# Patient Record
Sex: FEMALE | Race: WHITE | URBAN - METROPOLITAN AREA
[De-identification: names, ages, dates, MRNs, and addresses within clinical notes are randomized per-mention and may not be internally consistent; named-entity substitution may affect disease eponyms.]

---

## 2018-04-11 ENCOUNTER — INPATIENT (INPATIENT)
Facility: HOSPITAL | Age: 73
LOS: 6 days | Discharge: NURSING, REHAB OR LONG-TERM CARE | DRG: 872 | End: 2018-04-18
Attending: INTERNAL MEDICINE | Admitting: INTERNAL MEDICINE
Payer: MEDICARE

## 2018-04-11 VITALS
WEIGHT: 119.93 LBS | DIASTOLIC BLOOD PRESSURE: 66 MMHG | SYSTOLIC BLOOD PRESSURE: 126 MMHG | RESPIRATION RATE: 16 BRPM | OXYGEN SATURATION: 96 % | TEMPERATURE: 103 F | HEIGHT: 65 IN | HEART RATE: 102 BPM

## 2018-04-11 RX ORDER — SODIUM CHLORIDE 9 MG/ML
1000 INJECTION INTRAMUSCULAR; INTRAVENOUS; SUBCUTANEOUS ONCE
Qty: 0 | Refills: 0 | Status: COMPLETED | OUTPATIENT
Start: 2018-04-11 | End: 2018-04-11

## 2018-04-11 RX ORDER — ONDANSETRON 8 MG/1
4 TABLET, FILM COATED ORAL ONCE
Qty: 0 | Refills: 0 | Status: COMPLETED | OUTPATIENT
Start: 2018-04-11 | End: 2018-04-11

## 2018-04-12 DIAGNOSIS — R26.9 UNSPECIFIED ABNORMALITIES OF GAIT AND MOBILITY: ICD-10-CM

## 2018-04-12 DIAGNOSIS — N39.0 URINARY TRACT INFECTION, SITE NOT SPECIFIED: ICD-10-CM

## 2018-04-12 DIAGNOSIS — F32.9 MAJOR DEPRESSIVE DISORDER, SINGLE EPISODE, UNSPECIFIED: ICD-10-CM

## 2018-04-12 DIAGNOSIS — R32 UNSPECIFIED URINARY INCONTINENCE: ICD-10-CM

## 2018-04-12 DIAGNOSIS — F03.90 UNSPECIFIED DEMENTIA WITHOUT BEHAVIORAL DISTURBANCE: ICD-10-CM

## 2018-04-12 DIAGNOSIS — G20 PARKINSON'S DISEASE: ICD-10-CM

## 2018-04-12 DIAGNOSIS — Z29.9 ENCOUNTER FOR PROPHYLACTIC MEASURES, UNSPECIFIED: ICD-10-CM

## 2018-04-12 DIAGNOSIS — I65.29 OCCLUSION AND STENOSIS OF UNSPECIFIED CAROTID ARTERY: ICD-10-CM

## 2018-04-12 DIAGNOSIS — I10 ESSENTIAL (PRIMARY) HYPERTENSION: ICD-10-CM

## 2018-04-12 DIAGNOSIS — A41.9 SEPSIS, UNSPECIFIED ORGANISM: ICD-10-CM

## 2018-04-12 LAB
ALBUMIN SERPL ELPH-MCNC: 3.2 G/DL — LOW (ref 3.5–5)
ALP SERPL-CCNC: 129 U/L — HIGH (ref 40–120)
ALT FLD-CCNC: 22 U/L DA — SIGNIFICANT CHANGE UP (ref 10–60)
ANION GAP SERPL CALC-SCNC: 10 MMOL/L — SIGNIFICANT CHANGE UP (ref 5–17)
APPEARANCE UR: CLEAR — SIGNIFICANT CHANGE UP
AST SERPL-CCNC: 12 U/L — SIGNIFICANT CHANGE UP (ref 10–40)
BILIRUB SERPL-MCNC: 0.7 MG/DL — SIGNIFICANT CHANGE UP (ref 0.2–1.2)
BILIRUB UR-MCNC: NEGATIVE — SIGNIFICANT CHANGE UP
BUN SERPL-MCNC: 11 MG/DL — SIGNIFICANT CHANGE UP (ref 7–18)
CALCIUM SERPL-MCNC: 8.6 MG/DL — SIGNIFICANT CHANGE UP (ref 8.4–10.5)
CHLORIDE SERPL-SCNC: 105 MMOL/L — SIGNIFICANT CHANGE UP (ref 96–108)
CO2 SERPL-SCNC: 25 MMOL/L — SIGNIFICANT CHANGE UP (ref 22–31)
COLOR SPEC: YELLOW — SIGNIFICANT CHANGE UP
CREAT SERPL-MCNC: 0.74 MG/DL — SIGNIFICANT CHANGE UP (ref 0.5–1.3)
DIFF PNL FLD: ABNORMAL
EOSINOPHIL NFR BLD AUTO: 1 % — SIGNIFICANT CHANGE UP (ref 0–6)
GLUCOSE SERPL-MCNC: 126 MG/DL — HIGH (ref 70–99)
GLUCOSE UR QL: NEGATIVE — SIGNIFICANT CHANGE UP
HCT VFR BLD CALC: 33.6 % — LOW (ref 34.5–45)
HGB BLD-MCNC: 10.1 G/DL — LOW (ref 11.5–15.5)
KETONES UR-MCNC: NEGATIVE — SIGNIFICANT CHANGE UP
LACTATE SERPL-SCNC: 1.1 MMOL/L — SIGNIFICANT CHANGE UP (ref 0.7–2)
LACTATE SERPL-SCNC: 1.1 MMOL/L — SIGNIFICANT CHANGE UP (ref 0.7–2)
LEUKOCYTE ESTERASE UR-ACNC: ABNORMAL
LIDOCAIN IGE QN: 28 U/L — LOW (ref 73–393)
LYMPHOCYTES # BLD AUTO: 8 % — LOW (ref 13–44)
MCHC RBC-ENTMCNC: 26.9 PG — LOW (ref 27–34)
MCHC RBC-ENTMCNC: 30 GM/DL — LOW (ref 32–36)
MCV RBC AUTO: 89.7 FL — SIGNIFICANT CHANGE UP (ref 80–100)
MONOCYTES NFR BLD AUTO: 6 % — SIGNIFICANT CHANGE UP (ref 2–14)
NEUTROPHILS NFR BLD AUTO: 83 % — HIGH (ref 43–77)
NITRITE UR-MCNC: POSITIVE
PH UR: 6 — SIGNIFICANT CHANGE UP (ref 5–8)
PLATELET # BLD AUTO: 404 K/UL — HIGH (ref 150–400)
POTASSIUM SERPL-MCNC: 3.5 MMOL/L — SIGNIFICANT CHANGE UP (ref 3.5–5.3)
POTASSIUM SERPL-SCNC: 3.5 MMOL/L — SIGNIFICANT CHANGE UP (ref 3.5–5.3)
PROT SERPL-MCNC: 7.3 G/DL — SIGNIFICANT CHANGE UP (ref 6–8.3)
PROT UR-MCNC: 15
RAPID RVP RESULT: SIGNIFICANT CHANGE UP
RBC # BLD: 3.75 M/UL — LOW (ref 3.8–5.2)
RBC # FLD: 14.8 % — HIGH (ref 10.3–14.5)
SODIUM SERPL-SCNC: 140 MMOL/L — SIGNIFICANT CHANGE UP (ref 135–145)
SP GR SPEC: 1.01 — SIGNIFICANT CHANGE UP (ref 1.01–1.02)
UROBILINOGEN FLD QL: NEGATIVE — SIGNIFICANT CHANGE UP
WBC # BLD: 18.7 K/UL — HIGH (ref 3.8–10.5)
WBC # FLD AUTO: 18.7 K/UL — HIGH (ref 3.8–10.5)

## 2018-04-12 PROCEDURE — 71045 X-RAY EXAM CHEST 1 VIEW: CPT | Mod: 26

## 2018-04-12 PROCEDURE — 99285 EMERGENCY DEPT VISIT HI MDM: CPT | Mod: 25

## 2018-04-12 RX ORDER — ACETAMINOPHEN 500 MG
650 TABLET ORAL EVERY 6 HOURS
Qty: 0 | Refills: 0 | Status: DISCONTINUED | OUTPATIENT
Start: 2018-04-12 | End: 2018-04-18

## 2018-04-12 RX ORDER — SODIUM CHLORIDE 9 MG/ML
1000 INJECTION INTRAMUSCULAR; INTRAVENOUS; SUBCUTANEOUS
Qty: 0 | Refills: 0 | Status: COMPLETED | OUTPATIENT
Start: 2018-04-12 | End: 2018-04-12

## 2018-04-12 RX ORDER — OXYBUTYNIN CHLORIDE 5 MG
5 TABLET ORAL THREE TIMES A DAY
Qty: 0 | Refills: 0 | Status: DISCONTINUED | OUTPATIENT
Start: 2018-04-12 | End: 2018-04-18

## 2018-04-12 RX ORDER — ESCITALOPRAM OXALATE 10 MG/1
10 TABLET, FILM COATED ORAL DAILY
Qty: 0 | Refills: 0 | Status: DISCONTINUED | OUTPATIENT
Start: 2018-04-12 | End: 2018-04-18

## 2018-04-12 RX ORDER — ENOXAPARIN SODIUM 100 MG/ML
40 INJECTION SUBCUTANEOUS DAILY
Qty: 0 | Refills: 0 | Status: DISCONTINUED | OUTPATIENT
Start: 2018-04-12 | End: 2018-04-18

## 2018-04-12 RX ORDER — SODIUM CHLORIDE 9 MG/ML
500 INJECTION INTRAMUSCULAR; INTRAVENOUS; SUBCUTANEOUS ONCE
Qty: 0 | Refills: 0 | Status: COMPLETED | OUTPATIENT
Start: 2018-04-12 | End: 2018-04-12

## 2018-04-12 RX ORDER — SENNA PLUS 8.6 MG/1
2 TABLET ORAL AT BEDTIME
Qty: 0 | Refills: 0 | Status: DISCONTINUED | OUTPATIENT
Start: 2018-04-12 | End: 2018-04-18

## 2018-04-12 RX ORDER — NYSTATIN CREAM 100000 [USP'U]/G
1 CREAM TOPICAL
Qty: 0 | Refills: 0 | Status: DISCONTINUED | OUTPATIENT
Start: 2018-04-12 | End: 2018-04-18

## 2018-04-12 RX ORDER — HALOPERIDOL DECANOATE 100 MG/ML
1 INJECTION INTRAMUSCULAR ONCE
Qty: 0 | Refills: 0 | Status: COMPLETED | OUTPATIENT
Start: 2018-04-12 | End: 2018-04-12

## 2018-04-12 RX ORDER — CEFTRIAXONE 500 MG/1
1 INJECTION, POWDER, FOR SOLUTION INTRAMUSCULAR; INTRAVENOUS ONCE
Qty: 0 | Refills: 0 | Status: COMPLETED | OUTPATIENT
Start: 2018-04-12 | End: 2018-04-12

## 2018-04-12 RX ORDER — ASPIRIN/CALCIUM CARB/MAGNESIUM 324 MG
81 TABLET ORAL DAILY
Qty: 0 | Refills: 0 | Status: DISCONTINUED | OUTPATIENT
Start: 2018-04-12 | End: 2018-04-18

## 2018-04-12 RX ORDER — CEFTRIAXONE 500 MG/1
1 INJECTION, POWDER, FOR SOLUTION INTRAMUSCULAR; INTRAVENOUS EVERY 24 HOURS
Qty: 0 | Refills: 0 | Status: DISCONTINUED | OUTPATIENT
Start: 2018-04-12 | End: 2018-04-13

## 2018-04-12 RX ORDER — ACETAMINOPHEN 500 MG
975 TABLET ORAL ONCE
Qty: 0 | Refills: 0 | Status: COMPLETED | OUTPATIENT
Start: 2018-04-12 | End: 2018-04-12

## 2018-04-12 RX ORDER — ASCORBIC ACID 60 MG
1 TABLET,CHEWABLE ORAL
Qty: 0 | Refills: 0 | COMMUNITY

## 2018-04-12 RX ORDER — MIDAZOLAM HYDROCHLORIDE 1 MG/ML
1 INJECTION, SOLUTION INTRAMUSCULAR; INTRAVENOUS ONCE
Qty: 0 | Refills: 0 | Status: DISCONTINUED | OUTPATIENT
Start: 2018-04-12 | End: 2018-04-12

## 2018-04-12 RX ORDER — NICOTINE POLACRILEX 2 MG
1 GUM BUCCAL DAILY
Qty: 0 | Refills: 0 | Status: DISCONTINUED | OUTPATIENT
Start: 2018-04-12 | End: 2018-04-18

## 2018-04-12 RX ADMIN — Medication 81 MILLIGRAM(S): at 16:05

## 2018-04-12 RX ADMIN — Medication 650 MILLIGRAM(S): at 16:01

## 2018-04-12 RX ADMIN — CEFTRIAXONE 100 GRAM(S): 500 INJECTION, POWDER, FOR SOLUTION INTRAMUSCULAR; INTRAVENOUS at 01:45

## 2018-04-12 RX ADMIN — SODIUM CHLORIDE 100 MILLILITER(S): 9 INJECTION INTRAMUSCULAR; INTRAVENOUS; SUBCUTANEOUS at 10:25

## 2018-04-12 RX ADMIN — HALOPERIDOL DECANOATE 1 MILLIGRAM(S): 100 INJECTION INTRAMUSCULAR at 10:29

## 2018-04-12 RX ADMIN — ESCITALOPRAM OXALATE 10 MILLIGRAM(S): 10 TABLET, FILM COATED ORAL at 16:03

## 2018-04-12 RX ADMIN — Medication 5 MILLIGRAM(S): at 16:02

## 2018-04-12 RX ADMIN — SENNA PLUS 2 TABLET(S): 8.6 TABLET ORAL at 21:38

## 2018-04-12 RX ADMIN — Medication 975 MILLIGRAM(S): at 03:04

## 2018-04-12 RX ADMIN — ONDANSETRON 4 MILLIGRAM(S): 8 TABLET, FILM COATED ORAL at 01:29

## 2018-04-12 RX ADMIN — ENOXAPARIN SODIUM 40 MILLIGRAM(S): 100 INJECTION SUBCUTANEOUS at 16:05

## 2018-04-12 RX ADMIN — Medication 1 PATCH: at 20:15

## 2018-04-12 RX ADMIN — SODIUM CHLORIDE 500 MILLILITER(S): 9 INJECTION INTRAMUSCULAR; INTRAVENOUS; SUBCUTANEOUS at 01:40

## 2018-04-12 RX ADMIN — SODIUM CHLORIDE 1000 MILLILITER(S): 9 INJECTION INTRAMUSCULAR; INTRAVENOUS; SUBCUTANEOUS at 00:40

## 2018-04-12 NOTE — H&P ADULT - PROBLEM SELECTOR PLAN 1
meets sepsis criteria on admission: leukocytosis 18, fever 102, source of infection  s/p 2L bolus  rocephin  f/u ucx and bcx  tylenol PRN fever  CXR neg meets sepsis criteria on admission: leukocytosis 18, fever 102, source of infection  s/p 2L bolus  rocephin  f/u ucx and bcx  tylenol PRN fever  CXR neg  rvp neg  lactate normal meets sepsis criteria on admission: leukocytosis 18, fever 102, source of infection  s/p 2L bolus  rocephin  f/u ucx and bcx  tylenol PRN fever  CXR neg  rvp neg  lactate normal  Dr. Gaytan- ID

## 2018-04-12 NOTE — ED PROVIDER NOTE - OBJECTIVE STATEMENT
patient sent from Nursing Home for fever and 1 episode of vomiting small amount of food today. according to Nursing Home chart, patient on ceftin for bronchitis. patient with 3 days of fever. patient is anxious appearing, nervous.

## 2018-04-12 NOTE — PATIENT PROFILE ADULT. - HEALTH/HEALTHCARE ANXIETIES, PROFILE
none expressed but patient has been making attempts to get out of bed. Placed in front of nurse's station

## 2018-04-12 NOTE — H&P ADULT - NSHPPHYSICALEXAM_GEN_ALL_CORE
INTERVAL HPI/OVERNIGHT EVENTS:  T(C): 37.3 (04-12-18 @ 08:18), Max: 39.2 (04-11-18 @ 23:19)  HR: 71 (04-12-18 @ 08:18) (71 - 102)  BP: 125/49 (04-12-18 @ 08:18) (118/49 - 126/66)  RR: 18 (04-12-18 @ 08:18) (16 - 18)  SpO2: 98% (04-12-18 @ 08:18) (96% - 98%)    PHYSICAL EXAM:  GENERAL: agitated, well-groomed, well-developed  HEAD:  Atraumatic, Normocephalic  EYES: EOMI, PERRLA, conjunctiva and sclera clear  ENMT: No tonsillar erythema, exudates, or enlargement; dry mucous membranes  NECK: Supple, No JVD, Normal thyroid  NERVOUS SYSTEM:  Alert & Oriented 1, Motor Strength 5/5 B/L upper and lower extremities; DTRs 2+ intact and symmetric  CHEST/LUNG: Clear to percussion bilaterally; No rales, rhonchi, wheezing, or rubs  HEART: Regular rate and rhythm; No murmurs, rubs, or gallops  ABDOMEN: Soft, Nontender, Nondistended; Bowel sounds present  EXTREMITIES:  2+ Peripheral Pulses, No clubbing, cyanosis, or edema  LYMPH: No lymphadenopathy noted  SKIN: No rashes or lesions

## 2018-04-12 NOTE — H&P ADULT - PROBLEM SELECTOR PLAN 10
IMPROVE VTE Individual Risk Assessment          RISK                                                          Points  [  ] Previous VTE                                                3  [  ] Thrombophilia                                             2  [  ] Lower limb paralysis                                   2        (unable to hold up >15 seconds)    [  ] Current Cancer                                             2         (within 6 months)  [  x] Immobilization > 24 hrs                              1  [  ] ICU/CCU stay > 24 hours                             1  [  x] Age > 60                                                         1    IMPROVE VTE Score: 2, lovenox for dvt ppx

## 2018-04-12 NOTE — ED ADULT NURSE REASSESSMENT NOTE - NS ED NURSE REASSESS COMMENT FT1
Patient sitting up in bed, awake, in no acute distress. Safety precautions maintained. IVF in progress. ED observation continues.

## 2018-04-12 NOTE — H&P ADULT - ASSESSMENT
[facility] is a 72F [facility] with PMH dementia, , depression, Parkinson's, HTN, CVA, L carotid stenosis, sepsis 2/2 UTI 6 mo ago, gait instability presenting with 3 day fever. Admitted for sepsis 2/2 UTI.

## 2018-04-12 NOTE — H&P ADULT - NSHPLABSRESULTS_GEN_ALL_CORE
LABS:                        10.1   18.7  )-----------( 404      ( 2018 01:07 )             33.6     04-12    140  |  105  |  11  ----------------------------<  126<H>  3.5   |  25  |  0.74    Ca    8.6      2018 01:07    TPro  7.3  /  Alb  3.2<L>  /  TBili  0.7  /  DBili  x   /  AST  12  /  ALT  22  /  AlkPhos  129<H>  0412      Urinalysis Basic - ( 2018 03:30 )    Color: Yellow / Appearance: Clear / S.010 / pH: x  Gluc: x / Ketone: Negative  / Bili: Negative / Urobili: Negative   Blood: x / Protein: 15 / Nitrite: Positive   Leuk Esterase: Moderate / RBC: 2-5 /HPF / WBC 26-50 /HPF   Sq Epi: x / Non Sq Epi: Occasional /HPF / Bacteria: Many /HPF    < from: Xray Chest 1 View- PORTABLE-Urgent (18 @ 00:54) >    Normal heart mediastinum. No consolidation or effusion. Mild thoracic   dextroscoliosis.    Impression:  No active disease    < end of copied text > LABS:                        10.1   18.7  )-----------( 404      ( 2018 01:07 )             33.6     04-12    140  |  105  |  11  ----------------------------<  126<H>  3.5   |  25  |  0.74    Ca    8.6      2018 01:07    TPro  7.3  /  Alb  3.2<L>  /  TBili  0.7  /  DBili  x   /  AST  12  /  ALT  22  /  AlkPhos  129<H>  0412    Urinalysis Basic - ( 2018 03:30 )    Color: Yellow / Appearance: Clear / S.010 / pH: x  Gluc: x / Ketone: Negative  / Bili: Negative / Urobili: Negative   Blood: x / Protein: 15 / Nitrite: Positive   Leuk Esterase: Moderate / RBC: 2-5 /HPF / WBC 26-50 /HPF   Sq Epi: x / Non Sq Epi: Occasional /HPF / Bacteria: Many /HPF    < from: Xray Chest 1 View- PORTABLE-Urgent (18 @ 00:54) >    Normal heart mediastinum. No consolidation or effusion. Mild thoracic   dextroscoliosis.    Impression:  No active disease    < end of copied text >

## 2018-04-12 NOTE — ED PROVIDER NOTE - MEDICAL DECISION MAKING DETAILS
Patient with sepsis, fever.  code sepsis called. patient received 1mg versed for anxiolysis. IV fluids, tylenol. u/a revealed Urinary tract infection. admit for IV antibiotics IV fluids. abdomen soft nontender

## 2018-04-12 NOTE — H&P ADULT - HISTORY OF PRESENT ILLNESS
Patient is a 72F from [facility], AAOx[facility], with PMH dementia, , depression, Parkinson's, HTN, CVA, L carotid stenosis, sepsis 2/2 UTI 6 mo ago, gait instability presenting with 3 day fever. Patient was seen in the ED, agitated, not cooperative with full interview. Denies SOB, palpitations, chest pain, nausea, vomiting, diarrhea or constipation and reports wanting to return to the NH and says she feels generalized malaise. Got 1mg versed and 1mg haldol in the ED. Further history unobtainable due to agitation. As per NH, s/p recent course of ceftin for bronchitis 3/25-4/4.

## 2018-04-12 NOTE — ED ADULT NURSE NOTE - ED STAT RN HANDOFF DETAILS
Pt endorsed to [facility]. Pt is aox2. Pt awaiting bed assignment for admission. Pt not in any acute distress.

## 2018-04-12 NOTE — H&P ADULT - PMH
Carotid stenosis    Dementia    Depression    Gait abnormality    Hypertension    Parkinson disease    Urinary incontinence

## 2018-04-13 LAB
24R-OH-CALCIDIOL SERPL-MCNC: 20.4 NG/ML — LOW (ref 30–80)
ALBUMIN SERPL ELPH-MCNC: 2.9 G/DL — LOW (ref 3.5–5)
ALP SERPL-CCNC: 115 U/L — SIGNIFICANT CHANGE UP (ref 40–120)
ALT FLD-CCNC: 16 U/L DA — SIGNIFICANT CHANGE UP (ref 10–60)
ANION GAP SERPL CALC-SCNC: 11 MMOL/L — SIGNIFICANT CHANGE UP (ref 5–17)
AST SERPL-CCNC: 11 U/L — SIGNIFICANT CHANGE UP (ref 10–40)
BASOPHILS # BLD AUTO: 0.1 K/UL — SIGNIFICANT CHANGE UP (ref 0–0.2)
BASOPHILS NFR BLD AUTO: 0.7 % — SIGNIFICANT CHANGE UP (ref 0–2)
BILIRUB SERPL-MCNC: 0.4 MG/DL — SIGNIFICANT CHANGE UP (ref 0.2–1.2)
BUN SERPL-MCNC: 11 MG/DL — SIGNIFICANT CHANGE UP (ref 7–18)
CALCIUM SERPL-MCNC: 8.4 MG/DL — SIGNIFICANT CHANGE UP (ref 8.4–10.5)
CHLORIDE SERPL-SCNC: 105 MMOL/L — SIGNIFICANT CHANGE UP (ref 96–108)
CHOLEST SERPL-MCNC: 130 MG/DL — SIGNIFICANT CHANGE UP (ref 10–199)
CO2 SERPL-SCNC: 23 MMOL/L — SIGNIFICANT CHANGE UP (ref 22–31)
CREAT SERPL-MCNC: 0.56 MG/DL — SIGNIFICANT CHANGE UP (ref 0.5–1.3)
E COLI DNA BLD POS QL NAA+NON-PROBE: SIGNIFICANT CHANGE UP
EOSINOPHIL # BLD AUTO: 0 K/UL — SIGNIFICANT CHANGE UP (ref 0–0.5)
EOSINOPHIL NFR BLD AUTO: 0.2 % — SIGNIFICANT CHANGE UP (ref 0–6)
FOLATE SERPL-MCNC: 16.9 NG/ML — SIGNIFICANT CHANGE UP (ref 4.8–24.2)
GLUCOSE BLDC GLUCOMTR-MCNC: 115 MG/DL — HIGH (ref 70–99)
GLUCOSE SERPL-MCNC: 94 MG/DL — SIGNIFICANT CHANGE UP (ref 70–99)
GRAM STN FLD: SIGNIFICANT CHANGE UP
HBA1C BLD-MCNC: 6.1 % — HIGH (ref 4–5.6)
HCT VFR BLD CALC: 31.4 % — LOW (ref 34.5–45)
HDLC SERPL-MCNC: 26 MG/DL — LOW (ref 40–125)
HGB BLD-MCNC: 9.5 G/DL — LOW (ref 11.5–15.5)
INR BLD: 1.04 RATIO — SIGNIFICANT CHANGE UP (ref 0.88–1.16)
LIPID PNL WITH DIRECT LDL SERPL: 79 MG/DL — SIGNIFICANT CHANGE UP
LYMPHOCYTES # BLD AUTO: 15.8 % — SIGNIFICANT CHANGE UP (ref 13–44)
LYMPHOCYTES # BLD AUTO: 2.1 K/UL — SIGNIFICANT CHANGE UP (ref 1–3.3)
MAGNESIUM SERPL-MCNC: 1.8 MG/DL — SIGNIFICANT CHANGE UP (ref 1.6–2.6)
MCHC RBC-ENTMCNC: 26.8 PG — LOW (ref 27–34)
MCHC RBC-ENTMCNC: 30 GM/DL — LOW (ref 32–36)
MCV RBC AUTO: 89.1 FL — SIGNIFICANT CHANGE UP (ref 80–100)
METHOD TYPE: SIGNIFICANT CHANGE UP
MONOCYTES # BLD AUTO: 0.9 K/UL — SIGNIFICANT CHANGE UP (ref 0–0.9)
MONOCYTES NFR BLD AUTO: 6.9 % — SIGNIFICANT CHANGE UP (ref 2–14)
NEUTROPHILS # BLD AUTO: 10.4 K/UL — HIGH (ref 1.8–7.4)
NEUTROPHILS NFR BLD AUTO: 76.5 % — SIGNIFICANT CHANGE UP (ref 43–77)
PHOSPHATE SERPL-MCNC: 2.6 MG/DL — SIGNIFICANT CHANGE UP (ref 2.5–4.5)
PLATELET # BLD AUTO: 338 K/UL — SIGNIFICANT CHANGE UP (ref 150–400)
POTASSIUM SERPL-MCNC: 3.3 MMOL/L — LOW (ref 3.5–5.3)
POTASSIUM SERPL-SCNC: 3.3 MMOL/L — LOW (ref 3.5–5.3)
PROT SERPL-MCNC: 6.8 G/DL — SIGNIFICANT CHANGE UP (ref 6–8.3)
PROTHROM AB SERPL-ACNC: 11.4 SEC — SIGNIFICANT CHANGE UP (ref 9.8–12.7)
RBC # BLD: 3.53 M/UL — LOW (ref 3.8–5.2)
RBC # FLD: 14.8 % — HIGH (ref 10.3–14.5)
SODIUM SERPL-SCNC: 139 MMOL/L — SIGNIFICANT CHANGE UP (ref 135–145)
SPECIMEN SOURCE: SIGNIFICANT CHANGE UP
TOTAL CHOLESTEROL/HDL RATIO MEASUREMENT: 5 RATIO — SIGNIFICANT CHANGE UP (ref 3.3–7.1)
TRIGL SERPL-MCNC: 125 MG/DL — SIGNIFICANT CHANGE UP (ref 10–149)
TSH SERPL-MCNC: 1.5 UU/ML — SIGNIFICANT CHANGE UP (ref 0.34–4.82)
VIT B12 SERPL-MCNC: 398 PG/ML — SIGNIFICANT CHANGE UP (ref 232–1245)
WBC # BLD: 13.6 K/UL — HIGH (ref 3.8–10.5)
WBC # FLD AUTO: 13.6 K/UL — HIGH (ref 3.8–10.5)

## 2018-04-13 RX ORDER — POTASSIUM CHLORIDE 20 MEQ
40 PACKET (EA) ORAL ONCE
Qty: 0 | Refills: 0 | Status: COMPLETED | OUTPATIENT
Start: 2018-04-13 | End: 2018-04-13

## 2018-04-13 RX ORDER — HALOPERIDOL DECANOATE 100 MG/ML
1 INJECTION INTRAMUSCULAR ONCE
Qty: 0 | Refills: 0 | Status: COMPLETED | OUTPATIENT
Start: 2018-04-13 | End: 2018-04-13

## 2018-04-13 RX ORDER — CEFTRIAXONE 500 MG/1
1 INJECTION, POWDER, FOR SOLUTION INTRAMUSCULAR; INTRAVENOUS EVERY 24 HOURS
Qty: 0 | Refills: 0 | Status: DISCONTINUED | OUTPATIENT
Start: 2018-04-13 | End: 2018-04-14

## 2018-04-13 RX ORDER — ZALEPLON 10 MG
5 CAPSULE ORAL ONCE
Qty: 0 | Refills: 0 | Status: DISCONTINUED | OUTPATIENT
Start: 2018-04-13 | End: 2018-04-14

## 2018-04-13 RX ORDER — LACTULOSE 10 G/15ML
15 SOLUTION ORAL DAILY
Qty: 0 | Refills: 0 | Status: DISCONTINUED | OUTPATIENT
Start: 2018-04-13 | End: 2018-04-16

## 2018-04-13 RX ORDER — PIPERACILLIN AND TAZOBACTAM 4; .5 G/20ML; G/20ML
3.38 INJECTION, POWDER, LYOPHILIZED, FOR SOLUTION INTRAVENOUS EVERY 8 HOURS
Qty: 0 | Refills: 0 | Status: DISCONTINUED | OUTPATIENT
Start: 2018-04-13 | End: 2018-04-13

## 2018-04-13 RX ORDER — PIPERACILLIN AND TAZOBACTAM 4; .5 G/20ML; G/20ML
3.38 INJECTION, POWDER, LYOPHILIZED, FOR SOLUTION INTRAVENOUS ONCE
Qty: 0 | Refills: 0 | Status: COMPLETED | OUTPATIENT
Start: 2018-04-13 | End: 2018-04-13

## 2018-04-13 RX ORDER — POLYETHYLENE GLYCOL 3350 17 G/17G
17 POWDER, FOR SOLUTION ORAL DAILY
Qty: 0 | Refills: 0 | Status: DISCONTINUED | OUTPATIENT
Start: 2018-04-13 | End: 2018-04-16

## 2018-04-13 RX ADMIN — HALOPERIDOL DECANOATE 1 MILLIGRAM(S): 100 INJECTION INTRAMUSCULAR at 16:16

## 2018-04-13 RX ADMIN — Medication 5 MILLIGRAM(S): at 13:50

## 2018-04-13 RX ADMIN — SODIUM CHLORIDE 100 MILLILITER(S): 9 INJECTION INTRAMUSCULAR; INTRAVENOUS; SUBCUTANEOUS at 18:37

## 2018-04-13 RX ADMIN — Medication 40 MILLIEQUIVALENT(S): at 13:50

## 2018-04-13 RX ADMIN — Medication 1 PATCH: at 20:30

## 2018-04-13 RX ADMIN — CEFTRIAXONE 100 GRAM(S): 500 INJECTION, POWDER, FOR SOLUTION INTRAMUSCULAR; INTRAVENOUS at 01:12

## 2018-04-13 RX ADMIN — PIPERACILLIN AND TAZOBACTAM 25 GRAM(S): 4; .5 INJECTION, POWDER, LYOPHILIZED, FOR SOLUTION INTRAVENOUS at 13:50

## 2018-04-13 RX ADMIN — Medication 81 MILLIGRAM(S): at 12:22

## 2018-04-13 RX ADMIN — ENOXAPARIN SODIUM 40 MILLIGRAM(S): 100 INJECTION SUBCUTANEOUS at 12:22

## 2018-04-13 RX ADMIN — NYSTATIN CREAM 1 APPLICATION(S): 100000 CREAM TOPICAL at 17:24

## 2018-04-13 RX ADMIN — POLYETHYLENE GLYCOL 3350 17 GRAM(S): 17 POWDER, FOR SOLUTION ORAL at 13:48

## 2018-04-13 RX ADMIN — CEFTRIAXONE 100 GRAM(S): 500 INJECTION, POWDER, FOR SOLUTION INTRAMUSCULAR; INTRAVENOUS at 18:35

## 2018-04-13 RX ADMIN — Medication 5 MILLIGRAM(S): at 22:08

## 2018-04-13 RX ADMIN — NYSTATIN CREAM 1 APPLICATION(S): 100000 CREAM TOPICAL at 05:33

## 2018-04-13 RX ADMIN — LACTULOSE 15 GRAM(S): 10 SOLUTION ORAL at 13:48

## 2018-04-13 RX ADMIN — Medication 5 MILLIGRAM(S): at 05:33

## 2018-04-13 RX ADMIN — Medication 1 PATCH: at 13:49

## 2018-04-13 RX ADMIN — PIPERACILLIN AND TAZOBACTAM 200 GRAM(S): 4; .5 INJECTION, POWDER, LYOPHILIZED, FOR SOLUTION INTRAVENOUS at 10:30

## 2018-04-13 RX ADMIN — ESCITALOPRAM OXALATE 10 MILLIGRAM(S): 10 TABLET, FILM COATED ORAL at 12:23

## 2018-04-13 NOTE — ADVANCED PRACTICE NURSE CONSULT - ASSESSMENT
This is a 72yr old female patient admitted for UTI, presenting with a Stage 1 Pressure Injury to the Sacrum and Bilateral Heels; as evident by non-blanchable erythema

## 2018-04-13 NOTE — CONSULT NOTE ADULT - ASSESSMENT
sepsis  UTI  bacteremia  fevers  leukocytosis    plan - dc zosyn  start rocephin 1 gm iv q24 hrs  repeat blood cults in am

## 2018-04-13 NOTE — PROGRESS NOTE ADULT - SUBJECTIVE AND OBJECTIVE BOX
PGY 1 Note discussed with supervising resident and primary attending    Patient is a 72y old  Female who presents with a chief complaint of fever (2018 10:46)      INTERVAL HPI/OVERNIGHT EVENTS: Patient seen and examined at bed side, patient was calm, complains of constipation offers no new complaints; current symptoms resolving    MEDICATIONS  (STANDING):  aspirin  chewable 81 milliGRAM(s) Oral daily  enoxaparin Injectable 40 milliGRAM(s) SubCutaneous daily  escitalopram 10 milliGRAM(s) Oral daily  nicotine -   7 mG/24Hr(s) Patch 1 patch Transdermal daily  nystatin Cream 1 Application(s) Topical two times a day  oxybutynin 5 milliGRAM(s) Oral three times a day  piperacillin/tazobactam IVPB. 3.375 Gram(s) IV Intermittent every 8 hours  senna 2 Tablet(s) Oral at bedtime  sodium chloride 0.9%. 1000 milliLiter(s) (100 mL/Hr) IV Continuous <Continuous>    MEDICATIONS  (PRN):  acetaminophen   Tablet 650 milliGRAM(s) Oral every 6 hours PRN For Temp greater than 38 C (100.4 F)      __________________________________________________  REVIEW OF SYSTEMS:    CONSTITUTIONAL: No fever,   EYES: no acute visual disturbances  NECK: No pain or stiffness  RESPIRATORY: No cough; No shortness of breath  CARDIOVASCULAR: No chest pain, no palpitations  GASTROINTESTINAL: No pain. No nausea or vomiting; No diarrhea   NEUROLOGICAL: No headache or numbness, no tremors  MUSCULOSKELETAL: No joint pain, no muscle pain  GENITOURINARY: no dysuria, no frequency, no hesitancy  PSYCHIATRY: no depression , no anxiety  ALL OTHER  ROS negative        Vital Signs Last 24 Hrs  T(C): 37.2 (2018 08:53), Max: 39.3 (2018 15:49)  T(F): 99 (2018 08:53), Max: 102.7 (2018 15:49)  HR: 63 (2018 08:53) (63 - 81)  BP: 111/45 (2018 08:53) (111/45 - 133/62)  BP(mean): --  RR: 17 (2018 08:53) (15 - 18)  SpO2: 96% (2018 08:53) (95% - 99%)    ________________________________________________  PHYSICAL EXAM:  GENERAL: NAD  HEENT: Normocephalic;  conjunctivae and sclerae clear; moist mucous membranes;   NECK : supple  CHEST/LUNG: Clear to auscultation bilaterally with good air entry   HEART: S1 S2  regular; no murmurs, gallops or rubs  ABDOMEN: Soft, Nontender, Nondistended; Bowel sounds present  EXTREMITIES: no cyanosis; no edema; no calf tenderness  SKIN: warm and dry; no rash  NERVOUS SYSTEM:  AAOx2    _________________________________________________  LABS:                        9.5    13.6  )-----------( 338      ( 2018 05:55 )             31.4     04-13    139  |  105  |  11  ----------------------------<  94  3.3<L>   |  23  |  0.56    Ca    8.4      2018 05:55  Phos  2.6     04-13  Mg     1.8     04-13    TPro  6.8  /  Alb  2.9<L>  /  TBili  0.4  /  DBili  x   /  AST  11  /  ALT  16  /  AlkPhos  115  04-13    PT/INR - ( 2018 05:55 )   PT: 11.4 sec;   INR: 1.04 ratio           Urinalysis Basic - ( 2018 03:30 )    Color: Yellow / Appearance: Clear / S.010 / pH: x  Gluc: x / Ketone: Negative  / Bili: Negative / Urobili: Negative   Blood: x / Protein: 15 / Nitrite: Positive   Leuk Esterase: Moderate / RBC: 2-5 /HPF / WBC 26-50 /HPF   Sq Epi: x / Non Sq Epi: Occasional /HPF / Bacteria: Many /HPF      CAPILLARY BLOOD GLUCOSE      POCT Blood Glucose.: 115 mg/dL (2018 08:29)        RADIOLOGY & ADDITIONAL TESTS:  Culture - Urine (18 @ 10:30)    Specimen Source: .Urine Clean Catch (Midstream)    Culture Results:   >100,000 CFU/ml Escherichia coli      Culture - Blood (18 @ 11:19)    Specimen Source: .Blood Blood-Peripheral    Culture Results:   No growth to date.            Imaging Personally Reviewed:  YES    Consultant(s) Notes Reviewed:   YES    Care Discussed with Consultants : Yes     Plan of care was discussed with patient and /or primary care giver; all questions and concerns were addressed and care was aligned with patient's wishes.

## 2018-04-13 NOTE — PROGRESS NOTE ADULT - PROBLEM SELECTOR PLAN 2
Ua positive   urine and bllod cx positive for E. Coli   will treat patient with Zosyn   ID consult Dr Gaytan   f/u  repeat Blood cultures Ua positive   urine and bllod cx positive for E. Coli   will treat patient with Zosyn   ID consult Dr Gaytan   f/u  repeat Blood cultures  urine cultures shows ESBL   will hold Rocephin and stat patient on meropenem

## 2018-04-13 NOTE — ADVANCED PRACTICE NURSE CONSULT - RECOMMEDATIONS
-Apply a Foam dressing to the Sacrum Q 72hrs PRN  -Elevate/float the patients heels using heel protectors and reposition the patient Q 2hrs using wedges or pillows

## 2018-04-13 NOTE — PROVIDER CONTACT NOTE (CRITICAL VALUE NOTIFICATION) - SITUATION
pt 412C critical. Blood cult positive. preliminary results. growth in anaerobic bottle: gram negative rods.

## 2018-04-13 NOTE — CONSULT NOTE ADULT - SUBJECTIVE AND OBJECTIVE BOX
HPI:  Patient is a 72F from [facility], AAOx1, with PMH dementia, , depression, Parkinson's, HTN, CVA, L carotid stenosis, sepsis 2/2 UTI 6 mo ago, gait instability presenting with 3 day fever. Patient was seen in the ED, agitated, not cooperative with full interview. Denies SOB, palpitations, chest pain, nausea, vomiting, diarrhea or constipation and reports wanting to return to the NH and says she feels generalized malaise. Got 1mg versed and 1mg haldol in the ED. Further history unobtainable due to agitation. As per NH, s/p recent course of ceftin for bronchitis 3/25-.       PAST MEDICAL & SURGICAL HISTORY:  Gait abnormality  Carotid stenosis  Parkinson disease  Urinary incontinence  Depression  Dementia  Hypertension  No significant past surgical history      No Known Allergies      Meds:  acetaminophen   Tablet 650 milliGRAM(s) Oral every 6 hours PRN  aspirin  chewable 81 milliGRAM(s) Oral daily  enoxaparin Injectable 40 milliGRAM(s) SubCutaneous daily  escitalopram 10 milliGRAM(s) Oral daily  lactulose Syrup 15 Gram(s) Oral daily  nicotine -   7 mG/24Hr(s) Patch 1 patch Transdermal daily  nystatin Cream 1 Application(s) Topical two times a day  oxybutynin 5 milliGRAM(s) Oral three times a day  piperacillin/tazobactam IVPB. 3.375 Gram(s) IV Intermittent every 8 hours  polyethylene glycol 3350 17 Gram(s) Oral daily  senna 2 Tablet(s) Oral at bedtime  sodium chloride 0.9%. 1000 milliLiter(s) IV Continuous <Continuous>      SOCIAL HISTORY:  Smoker:   ETOH use:      FAMILY HISTORY:  No pertinent family history in first degree relatives      VITALS:  Vital Signs Last 24 Hrs  T(C): 37.2 (2018 08:53), Max: 39.3 (2018 15:49)  T(F): 99 (2018 08:53), Max: 102.7 (2018 15:49)  HR: 63 (2018 08:53) (63 - 81)  BP: 111/45 (2018 08:53) (111/45 - 133/62)  BP(mean): --  RR: 17 (2018 08:53) (15 - 18)  SpO2: 96% (2018 08:53) (95% - 99%)    LABS/DIAGNOSTIC TESTS:                          9.5    13.6  )-----------( 338      ( 2018 05:55 )             31.4     WBC Count: 13.6 K/uL ( @ 05:55)  WBC Count: 18.7 K/uL ( @ 01:07)          139  |  105  |  11  ----------------------------<  94  3.3<L>   |  23  |  0.56    Ca    8.4      2018 05:55  Phos  2.6       Mg     1.8         TPro  6.8  /  Alb  2.9<L>  /  TBili  0.4  /  DBili  x   /  AST  11  /  ALT  16  /  AlkPhos  115        Urinalysis Basic - ( 2018 03:30 )    Color: Yellow / Appearance: Clear / S.010 / pH: x  Gluc: x / Ketone: Negative  / Bili: Negative / Urobili: Negative   Blood: x / Protein: 15 / Nitrite: Positive   Leuk Esterase: Moderate / RBC: 2-5 /HPF / WBC 26-50 /HPF   Sq Epi: x / Non Sq Epi: Occasional /HPF / Bacteria: Many /HPF        LIVER FUNCTIONS - ( 2018 05:55 )  Alb: 2.9 g/dL / Pro: 6.8 g/dL / ALK PHOS: 115 U/L / ALT: 16 U/L DA / AST: 11 U/L / GGT: x             PT/INR - ( 2018 05:55 )   PT: 11.4 sec;   INR: 1.04 ratio             LACTATE:    ABG -     CULTURES:   .Blood Blood-Peripheral   @ 11:19   Growth in anaerobic bottle:  Gram Negative Rods    .Urine Clean Catch (Midstream)   @ 10:30   >100,000 CFU/ml Escherichia coli  --  --          RADIOLOGY:< from: Xray Chest 1 View- PORTABLE-Urgent (18 @ 00:54) >  EXAM:  XR CHEST PORTABLE URGENT 1V                            PROCEDURE DATE:  2018          INTERPRETATION:  Fever.    AP chest.    Normal heart mediastinum. No consolidation or effusion. Mild thoracic   dextroscoliosis.    Impression:  No active disease          < end of copied text >        ROS  [  ] UNABLE TO ELICIT HPI:  Patient is a 72F from [facility], AAOx 2, with PMH dementia, , depression, Parkinson's, HTN, CVA, L carotid stenosis, gait instability presenting with 3 days of fever. Pt found to have a high fever here along with a UTI and bacteremia with E. Coli. she did state to me that she had dysuria which she doesn't have at this time. Denies SOB, palpitations, chest pain, nausea, vomiting, diarrhea. As per NH, s/p recent course of ceftin for bronchitis 3/25-. she is doing much better today is afebrile today.      PAST MEDICAL & SURGICAL HISTORY:  Gait abnormality  Carotid stenosis  Parkinson disease  Urinary incontinence  Depression  Dementia  Hypertension  No significant past surgical history      No Known Allergies      Meds:  acetaminophen   Tablet 650 milliGRAM(s) Oral every 6 hours PRN  aspirin  chewable 81 milliGRAM(s) Oral daily  enoxaparin Injectable 40 milliGRAM(s) SubCutaneous daily  escitalopram 10 milliGRAM(s) Oral daily  lactulose Syrup 15 Gram(s) Oral daily  nicotine -   7 mG/24Hr(s) Patch 1 patch Transdermal daily  nystatin Cream 1 Application(s) Topical two times a day  oxybutynin 5 milliGRAM(s) Oral three times a day  piperacillin/tazobactam IVPB. 3.375 Gram(s) IV Intermittent every 8 hours  polyethylene glycol 3350 17 Gram(s) Oral daily  senna 2 Tablet(s) Oral at bedtime  sodium chloride 0.9%. 1000 milliLiter(s) IV Continuous <Continuous>      SOCIAL HISTORY:  Smoker: ex smoker  ETOH use: drank alcohol in past     FAMILY HISTORY:  No pertinent family history in first degree relatives      VITALS:  Vital Signs Last 24 Hrs  T(C): 37.2 (2018 08:53), Max: 39.3 (2018 15:49)  T(F): 99 (2018 08:53), Max: 102.7 (2018 15:49)  HR: 63 (2018 08:53) (63 - 81)  BP: 111/45 (2018 08:53) (111/45 - 133/62)  BP(mean): --  RR: 17 (2018 08:53) (15 - 18)  SpO2: 96% (2018 08:53) (95% - 99%)    LABS/DIAGNOSTIC TESTS:                          9.5    13.6  )-----------( 338      ( 2018 05:55 )             31.4     WBC Count: 13.6 K/uL ( @ 05:55)  WBC Count: 18.7 K/uL ( @ 01:07)          139  |  105  |  11  ----------------------------<  94  3.3<L>   |  23  |  0.56    Ca    8.4      2018 05:55  Phos  2.6       Mg     1.8         TPro  6.8  /  Alb  2.9<L>  /  TBili  0.4  /  DBili  x   /  AST  11  /  ALT  16  /  AlkPhos  115        Urinalysis Basic - ( 2018 03:30 )    Color: Yellow / Appearance: Clear / S.010 / pH: x  Gluc: x / Ketone: Negative  / Bili: Negative / Urobili: Negative   Blood: x / Protein: 15 / Nitrite: Positive   Leuk Esterase: Moderate / RBC: 2-5 /HPF / WBC 26-50 /HPF   Sq Epi: x / Non Sq Epi: Occasional /HPF / Bacteria: Many /HPF        LIVER FUNCTIONS - ( 2018 05:55 )  Alb: 2.9 g/dL / Pro: 6.8 g/dL / ALK PHOS: 115 U/L / ALT: 16 U/L DA / AST: 11 U/L / GGT: x             PT/INR - ( 2018 05:55 )   PT: 11.4 sec;   INR: 1.04 ratio             LACTATE:    ABG -     CULTURES:   .Blood Blood-Peripheral   @ 11:19   Growth in anaerobic bottle:  Gram Negative Rods    .Urine Clean Catch (Midstream)   @ 10:30   >100,000 CFU/ml Escherichia coli  --  --          RADIOLOGY:< from: Xray Chest 1 View- PORTABLE-Urgent (18 @ 00:54) >  EXAM:  XR CHEST PORTABLE URGENT 1V                            PROCEDURE DATE:  2018          INTERPRETATION:  Fever.    AP chest.    Normal heart mediastinum. No consolidation or effusion. Mild thoracic   dextroscoliosis.    Impression:  No active disease          < end of copied text >        ROS  [  ] UNABLE TO ELICIT

## 2018-04-14 LAB
-  AMIKACIN: SIGNIFICANT CHANGE UP
-  AMOXICILLIN/CLAVULANIC ACID: SIGNIFICANT CHANGE UP
-  AMPICILLIN/SULBACTAM: SIGNIFICANT CHANGE UP
-  AMPICILLIN: SIGNIFICANT CHANGE UP
-  AZTREONAM: SIGNIFICANT CHANGE UP
-  CEFAZOLIN: SIGNIFICANT CHANGE UP
-  CEFEPIME: SIGNIFICANT CHANGE UP
-  CEFOXITIN: SIGNIFICANT CHANGE UP
-  CEFTRIAXONE: SIGNIFICANT CHANGE UP
-  CIPROFLOXACIN: SIGNIFICANT CHANGE UP
-  ERTAPENEM: SIGNIFICANT CHANGE UP
-  GENTAMICIN: SIGNIFICANT CHANGE UP
-  IMIPENEM: SIGNIFICANT CHANGE UP
-  LEVOFLOXACIN: SIGNIFICANT CHANGE UP
-  MEROPENEM: SIGNIFICANT CHANGE UP
-  NITROFURANTOIN: SIGNIFICANT CHANGE UP
-  PIPERACILLIN/TAZOBACTAM: SIGNIFICANT CHANGE UP
-  TIGECYCLINE: SIGNIFICANT CHANGE UP
-  TOBRAMYCIN: SIGNIFICANT CHANGE UP
-  TRIMETHOPRIM/SULFAMETHOXAZOLE: SIGNIFICANT CHANGE UP
ANION GAP SERPL CALC-SCNC: 9 MMOL/L — SIGNIFICANT CHANGE UP (ref 5–17)
BUN SERPL-MCNC: 12 MG/DL — SIGNIFICANT CHANGE UP (ref 7–18)
CALCIUM SERPL-MCNC: 9 MG/DL — SIGNIFICANT CHANGE UP (ref 8.4–10.5)
CHLORIDE SERPL-SCNC: 105 MMOL/L — SIGNIFICANT CHANGE UP (ref 96–108)
CO2 SERPL-SCNC: 27 MMOL/L — SIGNIFICANT CHANGE UP (ref 22–31)
CREAT SERPL-MCNC: 0.72 MG/DL — SIGNIFICANT CHANGE UP (ref 0.5–1.3)
CULTURE RESULTS: SIGNIFICANT CHANGE UP
FERRITIN SERPL-MCNC: 366 NG/ML — HIGH (ref 15–150)
GLUCOSE SERPL-MCNC: 146 MG/DL — HIGH (ref 70–99)
HCT VFR BLD CALC: 31.9 % — LOW (ref 34.5–45)
HGB BLD-MCNC: 9.9 G/DL — LOW (ref 11.5–15.5)
IRON SATN MFR SERPL: 14 % — LOW (ref 15–50)
IRON SATN MFR SERPL: 30 UG/DL — LOW (ref 40–150)
MAGNESIUM SERPL-MCNC: 1.9 MG/DL — SIGNIFICANT CHANGE UP (ref 1.6–2.6)
MCHC RBC-ENTMCNC: 27.8 PG — SIGNIFICANT CHANGE UP (ref 27–34)
MCHC RBC-ENTMCNC: 31 GM/DL — LOW (ref 32–36)
MCV RBC AUTO: 89.5 FL — SIGNIFICANT CHANGE UP (ref 80–100)
METHOD TYPE: SIGNIFICANT CHANGE UP
ORGANISM # SPEC MICROSCOPIC CNT: SIGNIFICANT CHANGE UP
ORGANISM # SPEC MICROSCOPIC CNT: SIGNIFICANT CHANGE UP
PHOSPHATE SERPL-MCNC: 2.6 MG/DL — SIGNIFICANT CHANGE UP (ref 2.5–4.5)
PLATELET # BLD AUTO: 356 K/UL — SIGNIFICANT CHANGE UP (ref 150–400)
POTASSIUM SERPL-MCNC: 3.7 MMOL/L — SIGNIFICANT CHANGE UP (ref 3.5–5.3)
POTASSIUM SERPL-SCNC: 3.7 MMOL/L — SIGNIFICANT CHANGE UP (ref 3.5–5.3)
RBC # BLD: 3.56 M/UL — LOW (ref 3.8–5.2)
RBC # BLD: 3.63 M/UL — LOW (ref 3.8–5.2)
RBC # FLD: 15 % — HIGH (ref 10.3–14.5)
RETICS #: 49.4 K/UL — SIGNIFICANT CHANGE UP (ref 25–125)
RETICS/RBC NFR: 1.4 % — SIGNIFICANT CHANGE UP (ref 0.5–2.5)
SODIUM SERPL-SCNC: 141 MMOL/L — SIGNIFICANT CHANGE UP (ref 135–145)
SPECIMEN SOURCE: SIGNIFICANT CHANGE UP
TIBC SERPL-MCNC: 219 UG/DL — LOW (ref 250–450)
UIBC SERPL-MCNC: 189 UG/DL — SIGNIFICANT CHANGE UP (ref 110–370)
WBC # BLD: 8 K/UL — SIGNIFICANT CHANGE UP (ref 3.8–10.5)
WBC # FLD AUTO: 8 K/UL — SIGNIFICANT CHANGE UP (ref 3.8–10.5)

## 2018-04-14 RX ORDER — MEROPENEM 1 G/30ML
INJECTION INTRAVENOUS
Qty: 0 | Refills: 0 | Status: DISCONTINUED | OUTPATIENT
Start: 2018-04-14 | End: 2018-04-14

## 2018-04-14 RX ORDER — ERTAPENEM SODIUM 1 G/1
1000 INJECTION, POWDER, LYOPHILIZED, FOR SOLUTION INTRAMUSCULAR; INTRAVENOUS ONCE
Qty: 0 | Refills: 0 | Status: DISCONTINUED | OUTPATIENT
Start: 2018-04-14 | End: 2018-04-14

## 2018-04-14 RX ORDER — MEROPENEM 1 G/30ML
1000 INJECTION INTRAVENOUS EVERY 8 HOURS
Qty: 0 | Refills: 0 | Status: DISCONTINUED | OUTPATIENT
Start: 2018-04-14 | End: 2018-04-14

## 2018-04-14 RX ORDER — MEROPENEM 1 G/30ML
1000 INJECTION INTRAVENOUS ONCE
Qty: 0 | Refills: 0 | Status: DISCONTINUED | OUTPATIENT
Start: 2018-04-14 | End: 2018-04-14

## 2018-04-14 RX ORDER — ERTAPENEM SODIUM 1 G/1
INJECTION, POWDER, LYOPHILIZED, FOR SOLUTION INTRAMUSCULAR; INTRAVENOUS
Qty: 0 | Refills: 0 | Status: DISCONTINUED | OUTPATIENT
Start: 2018-04-14 | End: 2018-04-14

## 2018-04-14 RX ORDER — ERTAPENEM SODIUM 1 G/1
1000 INJECTION, POWDER, LYOPHILIZED, FOR SOLUTION INTRAMUSCULAR; INTRAVENOUS ONCE
Qty: 0 | Refills: 0 | Status: COMPLETED | OUTPATIENT
Start: 2018-04-14 | End: 2018-04-14

## 2018-04-14 RX ADMIN — ESCITALOPRAM OXALATE 10 MILLIGRAM(S): 10 TABLET, FILM COATED ORAL at 11:42

## 2018-04-14 RX ADMIN — Medication 5 MILLIGRAM(S): at 06:26

## 2018-04-14 RX ADMIN — ENOXAPARIN SODIUM 40 MILLIGRAM(S): 100 INJECTION SUBCUTANEOUS at 11:41

## 2018-04-14 RX ADMIN — Medication 81 MILLIGRAM(S): at 11:41

## 2018-04-14 RX ADMIN — Medication 1 PATCH: at 13:30

## 2018-04-14 RX ADMIN — NYSTATIN CREAM 1 APPLICATION(S): 100000 CREAM TOPICAL at 06:26

## 2018-04-14 RX ADMIN — Medication 1 PATCH: at 11:42

## 2018-04-14 RX ADMIN — SENNA PLUS 2 TABLET(S): 8.6 TABLET ORAL at 21:53

## 2018-04-14 RX ADMIN — ERTAPENEM SODIUM 220 MILLIGRAM(S): 1 INJECTION, POWDER, LYOPHILIZED, FOR SOLUTION INTRAMUSCULAR; INTRAVENOUS at 15:27

## 2018-04-14 RX ADMIN — NYSTATIN CREAM 1 APPLICATION(S): 100000 CREAM TOPICAL at 17:48

## 2018-04-14 RX ADMIN — Medication 5 MILLIGRAM(S): at 21:53

## 2018-04-14 RX ADMIN — Medication 5 MILLIGRAM(S): at 13:30

## 2018-04-14 RX ADMIN — Medication 5 MILLIGRAM(S): at 02:02

## 2018-04-14 NOTE — PROGRESS NOTE ADULT - SUBJECTIVE AND OBJECTIVE BOX
Patient is a 72y old  Female who presents with a chief complaint of fever (12 Apr 2018 10:46)    PATIENT IS SEEN AND EXAMINED IN MEDICAL FLOOR.    ALLERGIES:  No Known Allergies      VITALS:    Vital Signs Last 24 Hrs  T(C): 37.1 (14 Apr 2018 07:48), Max: 37.6 (13 Apr 2018 15:38)  T(F): 98.7 (14 Apr 2018 07:48), Max: 99.6 (13 Apr 2018 15:38)  HR: 67 (14 Apr 2018 07:48) (67 - 74)  BP: 107/54 (14 Apr 2018 07:48) (107/54 - 129/62)  BP(mean): --  RR: 17 (14 Apr 2018 07:48) (16 - 17)  SpO2: 100% (14 Apr 2018 07:48) (95% - 100%)    LABS:  CBC Full  -  ( 14 Apr 2018 10:13 )  WBC Count : 8.0 K/uL  Hemoglobin : 9.9 g/dL  Hematocrit : 31.9 %  Platelet Count - Automated : 356 K/uL  Mean Cell Volume : 89.5 fl  Mean Cell Hemoglobin : 27.8 pg  Mean Cell Hemoglobin Concentration : 31.0 gm/dL  Auto Neutrophil # : x  Auto Lymphocyte # : x  Auto Monocyte # : x  Auto Eosinophil # : x  Auto Basophil # : x  Auto Neutrophil % : x  Auto Lymphocyte % : x  Auto Monocyte % : x  Auto Eosinophil % : x  Auto Basophil % : x    PT/INR - ( 13 Apr 2018 05:55 )   PT: 11.4 sec;   INR: 1.04 ratio           04-14    141  |  105  |  12  ----------------------------<  146<H>  3.7   |  27  |  0.72    Ca    9.0      14 Apr 2018 10:13  Phos  2.6     04-14  Mg     1.9     04-14    TPro  6.8  /  Alb  2.9<L>  /  TBili  0.4  /  DBili  x   /  AST  11  /  ALT  16  /  AlkPhos  115  04-13    LIVER FUNCTIONS - ( 13 Apr 2018 05:55 )  Alb: 2.9 g/dL / Pro: 6.8 g/dL / ALK PHOS: 115 U/L / ALT: 16 U/L DA / AST: 11 U/L / GGT: x           .Blood Blood-Peripheral  04-12 @ 11:19   Growth in anaerobic bottle: Escherichia coli  "Due to technical problems, Proteus sp. will Not be reported as part of  the BCID panel until further notice"  ***Blood Panel PCR results on this specimen are available  approximately 3 hours after the Gram stain result.***  Gram stain, PCR, and/or culture results may not always  correspond due to difference in methodologies.  ************************************************************  This PCR assay was performed using [employer].  The following targets are tested for: Enterococcus,  vancomycin resistant enterococci, Listeria monocytogenes,  coagulase negative staphylococci, S. aureus,  methicillin resistant S. aureus, Streptococcus agalactiae  (Group B), S. pneumoniae, S. pyogenes (Group A),  Acinetobacter baumannii, Enterobacter cloacae, E. coli,  Klebsiella oxytoca, K. pneumoniae, Proteus sp.,  Serratia marcescens, Haemophilus influenzae,  Neisseria meningitidis, Pseudomonas aeruginosa, Candida  albicans, C. glabrata, C krusei, C parapsilosis,  C. tropicalis and the KPC resistance gene.  --  Blood Culture PCR      .Urine Clean Catch (Midstream)  04-12 @ 10:30   >100,000 CFU/ml Escherichia coli ESBL  --  Escherichia coli ESBL          MEDICATIONS:    MEDICATIONS  (STANDING):  aspirin  chewable 81 milliGRAM(s) Oral daily  enoxaparin Injectable 40 milliGRAM(s) SubCutaneous daily  ertapenem  IVPB      ertapenem  IVPB 1000 milliGRAM(s) IV Intermittent once  escitalopram 10 milliGRAM(s) Oral daily  lactulose Syrup 15 Gram(s) Oral daily  nicotine -   7 mG/24Hr(s) Patch 1 patch Transdermal daily  nystatin Cream 1 Application(s) Topical two times a day  oxybutynin 5 milliGRAM(s) Oral three times a day  polyethylene glycol 3350 17 Gram(s) Oral daily  senna 2 Tablet(s) Oral at bedtime      MEDICATIONS  (PRN):  acetaminophen   Tablet 650 milliGRAM(s) Oral every 6 hours PRN For Temp greater than 38 C (100.4 F)      REVIEW OF SYSTEMS:                           ALL ROS DONE [ X   ]    CONSTITUTIONAL:  LETHARGIC [   ], FEVER [   ], UNRESPONSIVE [   ]  CVS:  CP  [   ], SOB, [   ], PALPITATIONS [   ], DIZZYNESS [   ]  RS: COUGH [   ], SPUTUM [   ]  GI: ABDOMINAL PAIN [   ], NAUSEA [   ], VOMITINGS [   ], DIARRHEA [   ], CONSTIPATION [   ]  :  DYSURIA [   ], NOCTURIA [   ], INCREASED FREQUENCY [   ], DRIBLING [   ],  SKELETAL: PAINFUL JOINTS [   ], SWOLLEN JOINTS [   ], NECK ACHE [   ], LOW BACK ACHE [   ],  SKIN : ULCERS [   ], RASH [   ], ITCHING [   ]  CNS: HEAD ACHE [   ], DOUBLE VISION [   ], BLURRED VISION [   ], AMS / CONFUSION [   ], SEIZURES [   ], WEAKNESS [   ],TINGLING / NUMBNESS [   ]    PHYSICAL EXAMINATION:  GENERAL APPEARANCE: NO DISTRESS  HEENT:  NO PALLOR, NO  JVD,  NO   NODES, NECK SUPPLE  CVS: S1 +, S2 +,   RS: AEEB,  OCCASIONAL  RALES +,   NO RONCHI  ABD: SOFT, NT, NO, BS +  EXT: NO PE  SKIN: WARM,   SKELETAL:  ROM ACCEPTABLE  CNS:  AAO X 1-2   ,  NO DEFICITS    RADIOLOGY :    < from: Xray Chest 1 View- PORTABLE-Urgent (04.12.18 @ 00:54) >  INTERPRETATION:  Fever.    AP chest.    Normal heart mediastinum. No consolidation or effusion. Mild thoracic   dextroscoliosis.    Impression:  No active disease    < end of copied text >      ASSESSMENT :     Urinary tract infection  Gait abnormality  Carotid stenosis  Parkinson disease  Urinary incontinence  Depression  Dementia  Hypertension        PLAN:  HPI:  Patient is a 72F from [facility], [facility], with PMH dementia, , depression, Parkinson's, HTN, CVA, L carotid stenosis, sepsis 2/2 UTI 6 mo ago, gait instability presenting with 3 day fever. Patient was seen in the ED, agitated, not cooperative with full interview. Denies SOB, palpitations, chest pain, nausea, vomiting, diarrhea or constipation and reports wanting to return to the NH and says she feels generalized malaise. Got 1mg versed and 1mg haldol in the ED. Further history unobtainable due to agitation. As per NH, s/p recent course of ceftin for bronchitis 3/25-4/4. (12 Apr 2018 10:46)    - UROSEPSIS, E.COLI ESBL BACTEREMIA - PLACED ON ERTAPENEM, DCD ROCEPHIN  - GI AND DVT PROPHYLAXIS  - DR. MURRAY

## 2018-04-15 LAB
-  AMIKACIN: SIGNIFICANT CHANGE UP
-  AMPICILLIN/SULBACTAM: SIGNIFICANT CHANGE UP
-  AMPICILLIN: SIGNIFICANT CHANGE UP
-  AZTREONAM: SIGNIFICANT CHANGE UP
-  CEFAZOLIN: SIGNIFICANT CHANGE UP
-  CEFEPIME: SIGNIFICANT CHANGE UP
-  CEFOXITIN: SIGNIFICANT CHANGE UP
-  CEFTRIAXONE: SIGNIFICANT CHANGE UP
-  CIPROFLOXACIN: SIGNIFICANT CHANGE UP
-  ERTAPENEM: SIGNIFICANT CHANGE UP
-  GENTAMICIN: SIGNIFICANT CHANGE UP
-  IMIPENEM: SIGNIFICANT CHANGE UP
-  LEVOFLOXACIN: SIGNIFICANT CHANGE UP
-  MEROPENEM: SIGNIFICANT CHANGE UP
-  PIPERACILLIN/TAZOBACTAM: SIGNIFICANT CHANGE UP
-  TOBRAMYCIN: SIGNIFICANT CHANGE UP
-  TRIMETHOPRIM/SULFAMETHOXAZOLE: SIGNIFICANT CHANGE UP
ANION GAP SERPL CALC-SCNC: 10 MMOL/L — SIGNIFICANT CHANGE UP (ref 5–17)
BUN SERPL-MCNC: 10 MG/DL — SIGNIFICANT CHANGE UP (ref 7–18)
CALCIUM SERPL-MCNC: 8.7 MG/DL — SIGNIFICANT CHANGE UP (ref 8.4–10.5)
CHLORIDE SERPL-SCNC: 108 MMOL/L — SIGNIFICANT CHANGE UP (ref 96–108)
CO2 SERPL-SCNC: 26 MMOL/L — SIGNIFICANT CHANGE UP (ref 22–31)
CREAT SERPL-MCNC: 0.54 MG/DL — SIGNIFICANT CHANGE UP (ref 0.5–1.3)
CULTURE RESULTS: SIGNIFICANT CHANGE UP
GLUCOSE SERPL-MCNC: 96 MG/DL — SIGNIFICANT CHANGE UP (ref 70–99)
HCT VFR BLD CALC: 31.3 % — LOW (ref 34.5–45)
HGB BLD-MCNC: 10.2 G/DL — LOW (ref 11.5–15.5)
MCHC RBC-ENTMCNC: 29 PG — SIGNIFICANT CHANGE UP (ref 27–34)
MCHC RBC-ENTMCNC: 32.4 GM/DL — SIGNIFICANT CHANGE UP (ref 32–36)
MCV RBC AUTO: 89.4 FL — SIGNIFICANT CHANGE UP (ref 80–100)
METHOD TYPE: SIGNIFICANT CHANGE UP
ORGANISM # SPEC MICROSCOPIC CNT: SIGNIFICANT CHANGE UP
PLATELET # BLD AUTO: 361 K/UL — SIGNIFICANT CHANGE UP (ref 150–400)
POTASSIUM SERPL-MCNC: 3.6 MMOL/L — SIGNIFICANT CHANGE UP (ref 3.5–5.3)
POTASSIUM SERPL-SCNC: 3.6 MMOL/L — SIGNIFICANT CHANGE UP (ref 3.5–5.3)
RBC # BLD: 3.5 M/UL — LOW (ref 3.8–5.2)
RBC # FLD: 14.7 % — HIGH (ref 10.3–14.5)
SODIUM SERPL-SCNC: 144 MMOL/L — SIGNIFICANT CHANGE UP (ref 135–145)
SPECIMEN SOURCE: SIGNIFICANT CHANGE UP
WBC # BLD: 6.7 K/UL — SIGNIFICANT CHANGE UP (ref 3.8–10.5)
WBC # FLD AUTO: 6.7 K/UL — SIGNIFICANT CHANGE UP (ref 3.8–10.5)

## 2018-04-15 RX ADMIN — Medication 5 MILLIGRAM(S): at 21:47

## 2018-04-15 RX ADMIN — Medication 1 PATCH: at 12:10

## 2018-04-15 RX ADMIN — Medication 81 MILLIGRAM(S): at 12:11

## 2018-04-15 RX ADMIN — SENNA PLUS 2 TABLET(S): 8.6 TABLET ORAL at 21:47

## 2018-04-15 RX ADMIN — ENOXAPARIN SODIUM 40 MILLIGRAM(S): 100 INJECTION SUBCUTANEOUS at 12:11

## 2018-04-15 RX ADMIN — ESCITALOPRAM OXALATE 10 MILLIGRAM(S): 10 TABLET, FILM COATED ORAL at 12:11

## 2018-04-15 RX ADMIN — Medication 5 MILLIGRAM(S): at 13:14

## 2018-04-15 RX ADMIN — Medication 1 PATCH: at 12:11

## 2018-04-15 RX ADMIN — NYSTATIN CREAM 1 APPLICATION(S): 100000 CREAM TOPICAL at 06:25

## 2018-04-15 RX ADMIN — Medication 5 MILLIGRAM(S): at 06:25

## 2018-04-15 RX ADMIN — NYSTATIN CREAM 1 APPLICATION(S): 100000 CREAM TOPICAL at 17:34

## 2018-04-15 NOTE — PROGRESS NOTE ADULT - SUBJECTIVE AND OBJECTIVE BOX
Patient is a 72y old  Female who presents with a chief complaint of fever (12 Apr 2018 10:46)    PATIENT IS SEEN AND EXAMINED IN MEDICAL FLOOR.    ALLERGIES:  No Known Allergies    VITALS:    Vital Signs Last 24 Hrs  T(C): 36.1 (15 Apr 2018 08:27), Max: 37 (14 Apr 2018 15:40)  T(F): 97 (15 Apr 2018 08:27), Max: 98.6 (14 Apr 2018 15:40)  HR: 58 (15 Apr 2018 08:27) (55 - 68)  BP: 116/50 (15 Apr 2018 08:27) (115/54 - 135/64)  BP(mean): --  RR: 17 (15 Apr 2018 08:27) (16 - 17)  SpO2: 97% (15 Apr 2018 08:27) (96% - 98%)    LABS:  CBC Full  -  ( 15 Apr 2018 06:48 )  WBC Count : 6.7 K/uL  Hemoglobin : 10.2 g/dL  Hematocrit : 31.3 %  Platelet Count - Automated : 361 K/uL  Mean Cell Volume : 89.4 fl  Mean Cell Hemoglobin : 29.0 pg  Mean Cell Hemoglobin Concentration : 32.4 gm/dL  Auto Neutrophil # : x  Auto Lymphocyte # : x  Auto Monocyte # : x  Auto Eosinophil # : x  Auto Basophil # : x  Auto Neutrophil % : x  Auto Lymphocyte % : x  Auto Monocyte % : x  Auto Eosinophil % : x  Auto Basophil % : x      04-15    144  |  108  |  10  ----------------------------<  96  3.6   |  26  |  0.54    Ca    8.7      15 Apr 2018 06:48  Phos  2.6     04-14  Mg     1.9     04-14      .Blood Blood-Peripheral  04-12 @ 11:19   Growth in anaerobic bottle: Escherichia coli ESBL  "Due to technical problems, Proteus sp. will Not be reported as part of  the BCID panel until further notice"  --  Blood Culture PCR  Escherichia coli ESBL      .Urine Clean Catch (Midstream)  04-12 @ 10:30   >100,000 CFU/ml Escherichia coli ESBL  --  Escherichia coli ESBL          MEDICATIONS:    MEDICATIONS  (STANDING):  aspirin  chewable 81 milliGRAM(s) Oral daily  enoxaparin Injectable 40 milliGRAM(s) SubCutaneous daily  escitalopram 10 milliGRAM(s) Oral daily  lactulose Syrup 15 Gram(s) Oral daily  nicotine -   7 mG/24Hr(s) Patch 1 patch Transdermal daily  nystatin Cream 1 Application(s) Topical two times a day  oxybutynin 5 milliGRAM(s) Oral three times a day  polyethylene glycol 3350 17 Gram(s) Oral daily  senna 2 Tablet(s) Oral at bedtime      MEDICATIONS  (PRN):  acetaminophen   Tablet 650 milliGRAM(s) Oral every 6 hours PRN For Temp greater than 38 C (100.4 F)      REVIEW OF SYSTEMS:                           ALL ROS DONE [ X   ]    CONSTITUTIONAL:  LETHARGIC [   ], FEVER [   ], UNRESPONSIVE [   ]  CVS:  CP  [   ], SOB, [   ], PALPITATIONS [   ], DIZZYNESS [   ]  RS: COUGH [   ], SPUTUM [   ]  GI: ABDOMINAL PAIN [   ], NAUSEA [   ], VOMITINGS [   ], DIARRHEA [   ], CONSTIPATION [   ]  :  DYSURIA [   ], NOCTURIA [   ], INCREASED FREQUENCY [   ], DRIBLING [   ],  SKELETAL: PAINFUL JOINTS [   ], SWOLLEN JOINTS [   ], NECK ACHE [   ], LOW BACK ACHE [   ],  SKIN : ULCERS [   ], RASH [   ], ITCHING [   ]  CNS: HEAD ACHE [   ], DOUBLE VISION [   ], BLURRED VISION [   ], AMS / CONFUSION [   ], SEIZURES [   ], WEAKNESS [   ],TINGLING / NUMBNESS [   ]    PHYSICAL EXAMINATION:  GENERAL APPEARANCE: NO DISTRESS  HEENT:  NO PALLOR, NO  JVD,  NO   NODES, NECK SUPPLE  CVS: S1 +, S2 +,   RS: AEEB,  OCCASIONAL  RALES +,   NO RONCHI  ABD: SOFT, NT, NO, BS +  EXT: NO PE  SKIN: WARM,   SKELETAL:  ROM ACCEPTABLE  CNS:  AAO X 1-2   ,  NO DEFICITS    RADIOLOGY :    < from: Xray Chest 1 View- PORTABLE-Urgent (04.12.18 @ 00:54) >  INTERPRETATION:  Fever.    AP chest.    Normal heart mediastinum. No consolidation or effusion. Mild thoracic   dextroscoliosis.    Impression:  No active disease    < end of copied text >      ASSESSMENT :     Urinary tract infection  Gait abnormality  Carotid stenosis  Parkinson disease  Urinary incontinence  Depression  Dementia  Hypertension        PLAN:  HPI:  Patient is a 72F from [facility], [facility], with PMH dementia, , depression, Parkinson's, HTN, CVA, L carotid stenosis, sepsis 2/2 UTI 6 mo ago, gait instability presenting with 3 day fever. Patient was seen in the ED, agitated, not cooperative with full interview. Denies SOB, palpitations, chest pain, nausea, vomiting, diarrhea or constipation and reports wanting to return to the NH and says she feels generalized malaise. Got 1mg versed and 1mg haldol in the ED. Further history unobtainable due to agitation. As per NH, s/p recent course of ceftin for bronchitis 3/25-4/4. (12 Apr 2018 10:46)    - UROSEPSIS, E.COLI ESBL BACTEREMIA - ON ERTAPENEM  - REPEAT BLOOD CXS IN AM  - ANEMIA OF CHRONIC DISEASE ON FESO4  - DC PLAN BACK TO [facility], AFTER REPEAT BLOOD CXS ARE NEGATIVE  - GI AND DVT PROPHYLAXIS  - DR. MURRAY

## 2018-04-16 LAB
ANION GAP SERPL CALC-SCNC: 9 MMOL/L — SIGNIFICANT CHANGE UP (ref 5–17)
BUN SERPL-MCNC: 11 MG/DL — SIGNIFICANT CHANGE UP (ref 7–18)
CALCIUM SERPL-MCNC: 8.7 MG/DL — SIGNIFICANT CHANGE UP (ref 8.4–10.5)
CHLORIDE SERPL-SCNC: 109 MMOL/L — HIGH (ref 96–108)
CO2 SERPL-SCNC: 25 MMOL/L — SIGNIFICANT CHANGE UP (ref 22–31)
CREAT SERPL-MCNC: 0.5 MG/DL — SIGNIFICANT CHANGE UP (ref 0.5–1.3)
GLUCOSE BLDC GLUCOMTR-MCNC: 126 MG/DL — HIGH (ref 70–99)
GLUCOSE BLDC GLUCOMTR-MCNC: 144 MG/DL — HIGH (ref 70–99)
GLUCOSE SERPL-MCNC: 89 MG/DL — SIGNIFICANT CHANGE UP (ref 70–99)
HCT VFR BLD CALC: 30.6 % — LOW (ref 34.5–45)
HGB BLD-MCNC: 9.7 G/DL — LOW (ref 11.5–15.5)
MCHC RBC-ENTMCNC: 28.3 PG — SIGNIFICANT CHANGE UP (ref 27–34)
MCHC RBC-ENTMCNC: 31.6 GM/DL — LOW (ref 32–36)
MCV RBC AUTO: 89.6 FL — SIGNIFICANT CHANGE UP (ref 80–100)
PLATELET # BLD AUTO: 351 K/UL — SIGNIFICANT CHANGE UP (ref 150–400)
POTASSIUM SERPL-MCNC: 3.3 MMOL/L — LOW (ref 3.5–5.3)
POTASSIUM SERPL-SCNC: 3.3 MMOL/L — LOW (ref 3.5–5.3)
RBC # BLD: 3.42 M/UL — LOW (ref 3.8–5.2)
RBC # FLD: 14.7 % — HIGH (ref 10.3–14.5)
SODIUM SERPL-SCNC: 143 MMOL/L — SIGNIFICANT CHANGE UP (ref 135–145)
TRANSFERRIN SERPL-MCNC: 195 MG/DL — LOW (ref 200–360)
WBC # BLD: 7.5 K/UL — SIGNIFICANT CHANGE UP (ref 3.8–10.5)
WBC # FLD AUTO: 7.5 K/UL — SIGNIFICANT CHANGE UP (ref 3.8–10.5)

## 2018-04-16 RX ORDER — POTASSIUM CHLORIDE 20 MEQ
40 PACKET (EA) ORAL ONCE
Qty: 0 | Refills: 0 | Status: COMPLETED | OUTPATIENT
Start: 2018-04-16 | End: 2018-04-16

## 2018-04-16 RX ORDER — ERTAPENEM SODIUM 1 G/1
1000 INJECTION, POWDER, LYOPHILIZED, FOR SOLUTION INTRAMUSCULAR; INTRAVENOUS ONCE
Qty: 0 | Refills: 0 | Status: DISCONTINUED | OUTPATIENT
Start: 2018-04-16 | End: 2018-04-16

## 2018-04-16 RX ORDER — ERTAPENEM SODIUM 1 G/1
1000 INJECTION, POWDER, LYOPHILIZED, FOR SOLUTION INTRAMUSCULAR; INTRAVENOUS ONCE
Qty: 0 | Refills: 0 | Status: COMPLETED | OUTPATIENT
Start: 2018-04-16 | End: 2018-04-16

## 2018-04-16 RX ORDER — ERTAPENEM SODIUM 1 G/1
INJECTION, POWDER, LYOPHILIZED, FOR SOLUTION INTRAMUSCULAR; INTRAVENOUS
Qty: 0 | Refills: 0 | Status: DISCONTINUED | OUTPATIENT
Start: 2018-04-16 | End: 2018-04-16

## 2018-04-16 RX ORDER — ERTAPENEM SODIUM 1 G/1
1000 INJECTION, POWDER, LYOPHILIZED, FOR SOLUTION INTRAMUSCULAR; INTRAVENOUS EVERY 24 HOURS
Qty: 0 | Refills: 0 | Status: DISCONTINUED | OUTPATIENT
Start: 2018-04-17 | End: 2018-04-18

## 2018-04-16 RX ADMIN — ERTAPENEM SODIUM 220 MILLIGRAM(S): 1 INJECTION, POWDER, LYOPHILIZED, FOR SOLUTION INTRAMUSCULAR; INTRAVENOUS at 10:51

## 2018-04-16 RX ADMIN — Medication 81 MILLIGRAM(S): at 12:12

## 2018-04-16 RX ADMIN — Medication 40 MILLIEQUIVALENT(S): at 18:37

## 2018-04-16 RX ADMIN — NYSTATIN CREAM 1 APPLICATION(S): 100000 CREAM TOPICAL at 06:46

## 2018-04-16 RX ADMIN — Medication 5 MILLIGRAM(S): at 21:46

## 2018-04-16 RX ADMIN — NYSTATIN CREAM 1 APPLICATION(S): 100000 CREAM TOPICAL at 17:00

## 2018-04-16 RX ADMIN — Medication 1 PATCH: at 12:12

## 2018-04-16 RX ADMIN — ESCITALOPRAM OXALATE 10 MILLIGRAM(S): 10 TABLET, FILM COATED ORAL at 12:12

## 2018-04-16 RX ADMIN — Medication 5 MILLIGRAM(S): at 06:45

## 2018-04-16 RX ADMIN — Medication 5 MILLIGRAM(S): at 14:59

## 2018-04-16 RX ADMIN — Medication 1 PATCH: at 12:13

## 2018-04-16 RX ADMIN — ENOXAPARIN SODIUM 40 MILLIGRAM(S): 100 INJECTION SUBCUTANEOUS at 12:12

## 2018-04-16 NOTE — PROGRESS NOTE ADULT - PROBLEM SELECTOR PLAN 2
Ua positive   urine and bllod cx positive for E. Coli   will treat patient with Zosyn   ID consult Dr Gaytan   f/u  repeat Blood cultures  urine cultures shows ESBL   c/w Invanz PICC line for iv antibiotics for 12 days

## 2018-04-16 NOTE — PROGRESS NOTE ADULT - SUBJECTIVE AND OBJECTIVE BOX
PGY 1 Note discussed with supervising resident and primary attending    Patient is a 72y old  Female who presents with a chief complaint of fever (12 Apr 2018 10:46)      INTERVAL HPI/OVERNIGHT EVENTS: offers no new complaints; current symptoms resolving    MEDICATIONS  (STANDING):  aspirin  chewable 81 milliGRAM(s) Oral daily  enoxaparin Injectable 40 milliGRAM(s) SubCutaneous daily  escitalopram 10 milliGRAM(s) Oral daily  nicotine -   7 mG/24Hr(s) Patch 1 patch Transdermal daily  nystatin Cream 1 Application(s) Topical two times a day  oxybutynin 5 milliGRAM(s) Oral three times a day  senna 2 Tablet(s) Oral at bedtime    MEDICATIONS  (PRN):  acetaminophen   Tablet 650 milliGRAM(s) Oral every 6 hours PRN For Temp greater than 38 C (100.4 F)      __________________________________________________  REVIEW OF SYSTEMS:    CONSTITUTIONAL: No fever,   EYES: no acute visual disturbances  NECK: No pain or stiffness  RESPIRATORY: No cough; No shortness of breath  CARDIOVASCULAR: No chest pain, no palpitations  GASTROINTESTINAL: No pain. No nausea or vomiting; No diarrhea   NEUROLOGICAL: No headache or numbness, no tremors  MUSCULOSKELETAL: No joint pain, no muscle pain  GENITOURINARY: no dysuria, no frequency, no hesitancy  PSYCHIATRY: no depression , no anxiety  ALL OTHER  ROS negative        Vital Signs Last 24 Hrs  T(C): 36.9 (16 Apr 2018 15:57), Max: 36.9 (16 Apr 2018 15:57)  T(F): 98.5 (16 Apr 2018 15:57), Max: 98.5 (16 Apr 2018 15:57)  HR: 65 (16 Apr 2018 15:57) (55 - 65)  BP: 128/54 (16 Apr 2018 15:57) (128/54 - 141/62)  BP(mean): --  RR: 16 (16 Apr 2018 15:57) (16 - 17)  SpO2: 94% (16 Apr 2018 15:57) (94% - 99%)    ________________________________________________  PHYSICAL EXAM:  GENERAL: NAD  HEENT: Normocephalic;  conjunctivae and sclerae clear; moist mucous membranes;   NECK : supple  CHEST/LUNG: Clear to auscultation bilaterally with good air entry   HEART: S1 S2  regular; no murmurs, gallops or rubs  ABDOMEN: Soft, Nontender, Nondistended; Bowel sounds present  EXTREMITIES: no cyanosis; no edema; no calf tenderness  SKIN: warm and dry; no rash  NERVOUS SYSTEM:  Awake and alert; Oriented  to place, person and time ; no new deficits    _________________________________________________  LABS:                        9.7    7.5   )-----------( 351      ( 16 Apr 2018 06:07 )             30.6     04-16    143  |  109<H>  |  11  ----------------------------<  89  3.3<L>   |  25  |  0.50    Ca    8.7      16 Apr 2018 06:07          CAPILLARY BLOOD GLUCOSE            RADIOLOGY & ADDITIONAL TESTS:    Imaging Personally Reviewed:  YES/NO    Consultant(s) Notes Reviewed:   YES/ No    Care Discussed with Consultants :     Plan of care was discussed with patient and /or primary care giver; all questions and concerns were addressed and care was aligned with patient's wishes.

## 2018-04-17 LAB
ANION GAP SERPL CALC-SCNC: 7 MMOL/L — SIGNIFICANT CHANGE UP (ref 5–17)
BASOPHILS # BLD AUTO: 0.1 K/UL — SIGNIFICANT CHANGE UP (ref 0–0.2)
BASOPHILS NFR BLD AUTO: 1.1 % — SIGNIFICANT CHANGE UP (ref 0–2)
BUN SERPL-MCNC: 10 MG/DL — SIGNIFICANT CHANGE UP (ref 7–18)
CALCIUM SERPL-MCNC: 9 MG/DL — SIGNIFICANT CHANGE UP (ref 8.4–10.5)
CHLORIDE SERPL-SCNC: 109 MMOL/L — HIGH (ref 96–108)
CO2 SERPL-SCNC: 26 MMOL/L — SIGNIFICANT CHANGE UP (ref 22–31)
CREAT SERPL-MCNC: 0.63 MG/DL — SIGNIFICANT CHANGE UP (ref 0.5–1.3)
CULTURE RESULTS: SIGNIFICANT CHANGE UP
EOSINOPHIL # BLD AUTO: 0.4 K/UL — SIGNIFICANT CHANGE UP (ref 0–0.5)
EOSINOPHIL NFR BLD AUTO: 4.7 % — SIGNIFICANT CHANGE UP (ref 0–6)
GLUCOSE SERPL-MCNC: 98 MG/DL — SIGNIFICANT CHANGE UP (ref 70–99)
HCT VFR BLD CALC: 32.4 % — LOW (ref 34.5–45)
HGB BLD-MCNC: 9.5 G/DL — LOW (ref 11.5–15.5)
LYMPHOCYTES # BLD AUTO: 3.1 K/UL — SIGNIFICANT CHANGE UP (ref 1–3.3)
LYMPHOCYTES # BLD AUTO: 38.4 % — SIGNIFICANT CHANGE UP (ref 13–44)
MCHC RBC-ENTMCNC: 26.6 PG — LOW (ref 27–34)
MCHC RBC-ENTMCNC: 29.4 GM/DL — LOW (ref 32–36)
MCV RBC AUTO: 90.6 FL — SIGNIFICANT CHANGE UP (ref 80–100)
MONOCYTES # BLD AUTO: 0.4 K/UL — SIGNIFICANT CHANGE UP (ref 0–0.9)
MONOCYTES NFR BLD AUTO: 4.9 % — SIGNIFICANT CHANGE UP (ref 2–14)
NEUTROPHILS # BLD AUTO: 4.1 K/UL — SIGNIFICANT CHANGE UP (ref 1.8–7.4)
NEUTROPHILS NFR BLD AUTO: 50.9 % — SIGNIFICANT CHANGE UP (ref 43–77)
PLATELET # BLD AUTO: 426 K/UL — HIGH (ref 150–400)
POTASSIUM SERPL-MCNC: 3.9 MMOL/L — SIGNIFICANT CHANGE UP (ref 3.5–5.3)
POTASSIUM SERPL-SCNC: 3.9 MMOL/L — SIGNIFICANT CHANGE UP (ref 3.5–5.3)
RBC # BLD: 3.57 M/UL — LOW (ref 3.8–5.2)
RBC # FLD: 15.3 % — HIGH (ref 10.3–14.5)
SODIUM SERPL-SCNC: 142 MMOL/L — SIGNIFICANT CHANGE UP (ref 135–145)
SPECIMEN SOURCE: SIGNIFICANT CHANGE UP
WBC # BLD: 8 K/UL — SIGNIFICANT CHANGE UP (ref 3.8–10.5)
WBC # FLD AUTO: 8 K/UL — SIGNIFICANT CHANGE UP (ref 3.8–10.5)

## 2018-04-17 PROCEDURE — 77001 FLUOROGUIDE FOR VEIN DEVICE: CPT | Mod: 26

## 2018-04-17 PROCEDURE — 76937 US GUIDE VASCULAR ACCESS: CPT | Mod: 26

## 2018-04-17 PROCEDURE — 36569 INSJ PICC 5 YR+ W/O IMAGING: CPT

## 2018-04-17 PROCEDURE — 71045 X-RAY EXAM CHEST 1 VIEW: CPT | Mod: 26

## 2018-04-17 RX ORDER — SODIUM CHLORIDE 9 MG/ML
10 INJECTION INTRAMUSCULAR; INTRAVENOUS; SUBCUTANEOUS
Qty: 0 | Refills: 0 | Status: DISCONTINUED | OUTPATIENT
Start: 2018-04-17 | End: 2018-04-18

## 2018-04-17 RX ORDER — LANOLIN ALCOHOL/MO/W.PET/CERES
3 CREAM (GRAM) TOPICAL ONCE
Qty: 0 | Refills: 0 | Status: COMPLETED | OUTPATIENT
Start: 2018-04-17 | End: 2018-04-17

## 2018-04-17 RX ORDER — SODIUM CHLORIDE 9 MG/ML
20 INJECTION INTRAMUSCULAR; INTRAVENOUS; SUBCUTANEOUS ONCE
Qty: 0 | Refills: 0 | Status: DISCONTINUED | OUTPATIENT
Start: 2018-04-17 | End: 2018-04-18

## 2018-04-17 RX ORDER — ERGOCALCIFEROL 1.25 MG/1
50000 CAPSULE ORAL
Qty: 0 | Refills: 0 | Status: DISCONTINUED | OUTPATIENT
Start: 2018-04-17 | End: 2018-04-18

## 2018-04-17 RX ORDER — SODIUM CHLORIDE 9 MG/ML
10 INJECTION INTRAMUSCULAR; INTRAVENOUS; SUBCUTANEOUS EVERY 12 HOURS
Qty: 0 | Refills: 0 | Status: DISCONTINUED | OUTPATIENT
Start: 2018-04-17 | End: 2018-04-18

## 2018-04-17 RX ADMIN — Medication 81 MILLIGRAM(S): at 12:00

## 2018-04-17 RX ADMIN — ENOXAPARIN SODIUM 40 MILLIGRAM(S): 100 INJECTION SUBCUTANEOUS at 12:00

## 2018-04-17 RX ADMIN — Medication 5 MILLIGRAM(S): at 21:54

## 2018-04-17 RX ADMIN — ERGOCALCIFEROL 50000 UNIT(S): 1.25 CAPSULE ORAL at 12:00

## 2018-04-17 RX ADMIN — Medication 1 PATCH: at 14:31

## 2018-04-17 RX ADMIN — Medication 5 MILLIGRAM(S): at 06:08

## 2018-04-17 RX ADMIN — ESCITALOPRAM OXALATE 10 MILLIGRAM(S): 10 TABLET, FILM COATED ORAL at 12:00

## 2018-04-17 RX ADMIN — Medication 5 MILLIGRAM(S): at 14:32

## 2018-04-17 RX ADMIN — Medication 1 PATCH: at 13:00

## 2018-04-17 RX ADMIN — NYSTATIN CREAM 1 APPLICATION(S): 100000 CREAM TOPICAL at 17:56

## 2018-04-17 RX ADMIN — NYSTATIN CREAM 1 APPLICATION(S): 100000 CREAM TOPICAL at 06:08

## 2018-04-17 RX ADMIN — ERTAPENEM SODIUM 220 MILLIGRAM(S): 1 INJECTION, POWDER, LYOPHILIZED, FOR SOLUTION INTRAMUSCULAR; INTRAVENOUS at 10:16

## 2018-04-17 RX ADMIN — Medication 3 MILLIGRAM(S): at 00:42

## 2018-04-17 NOTE — PROGRESS NOTE ADULT - PROBLEM SELECTOR PLAN 5
not on medications for Parkinson's  fall risk

## 2018-04-17 NOTE — PROGRESS NOTE ADULT - PROVIDER SPECIALTY LIST ADULT
Infectious Disease
Internal Medicine

## 2018-04-17 NOTE — PROGRESS NOTE ADULT - PROBLEM SELECTOR PLAN 8
hx L carotid stenosis  c/w aspirin

## 2018-04-17 NOTE — PROGRESS NOTE ADULT - ASSESSMENT
[facility] is a 72F [place] [facility], with PMH [place], , depression, Parkinson's, HTN, CVA, L carotid stenosis, sepsis 2/2 UTI 6 mo ago, gait instability presenting with 3 day fever. Admitted for sepsis 2/2 UTI.
[facility] 72[facility] with PM[facility], , depression, Parkinson's, HTN, CVA, L carotid stenosis, sepsis 2/2 UTI 6 mo ago, gait instability presenting with 3 day fever. Admitted for sepsis 2/2 UTI.
Patient is a 72F [facility] with PMH dementia, , depression, Parkinson's, HTN, CVA, L carotid stenosis, sepsis 2/2 UTI 6 mo ago, gait instability presenting with 3 day fever. Admitted for sepsis 2/2 UTI.
UTI  bacteremia(ESBL)    plan - cont Invanz 1gm iv q24hrs needs 11 days more including tomorrows dose  dc planning

## 2018-04-17 NOTE — PROGRESS NOTE ADULT - PROBLEM SELECTOR PLAN 1
meets sepsis criteria on admission: leukocytosis 18, fever 102, source of infection  s/p 2L bolus s/p Rocephin   CXR negative, RVP negative, Lactate normal   UA positive for WBC and esterase   urine and blood cultures positive for E. Coli   Sensitivity pending  will treated with Zosyn IV Q8 hrs   ID Dr Gaytan consulted   currently patient is been afebrile, leucocytosis improving   f/u repeat blood cultures
meets sepsis criteria on admission: leukocytosis 18, fever 102, source of infection  s/p 2L bolus s/p Rocephin   CXR negative, RVP negative, Lactate normal   UA positive for WBC and esterase   urine and blood cultures positive for E. Coli   Sensitivity pending  will treated with Zosyn IV Q8 hrs   ID Dr Gaytan consulted   currently patient is been afebrile, leucocytosis improving   blood cultures negative   IV Invanz for 12 more days will get PICC line
meets sepsis criteria on admission: leukocytosis 18, fever 102, source of infection  s/p 2L bolus s/p Rocephin   CXR negative, RVP negative, Lactate normal   UA positive for WBC and esterase   urine and blood cultures positive for E. Coli   Sensitivity pending  will treated with Zosyn IV Q8 hrs   ID Dr Gaytan consulted   currently patient is been afebrile, leucocytosis improving   blood cultures negative   IV Invanz for 12 more days   picc line placement   probable discharge tomorrow   Day 3 of INVANZ

## 2018-04-17 NOTE — PROGRESS NOTE ADULT - PROBLEM SELECTOR PLAN 3
holding norvasc in setting of sepsis  resume as indicated  monitor BP  TSH, A1c wnl

## 2018-04-17 NOTE — PROGRESS NOTE ADULT - SUBJECTIVE AND OBJECTIVE BOX
72y Female    Meds:  ertapenem  IVPB 1000 milliGRAM(s) IV Intermittent every 24 hours    Allergies    No Known Allergies    Intolerances        VITALS:  Vital Signs Last 24 Hrs  T(C): 36.6 (17 Apr 2018 16:03), Max: 36.8 (16 Apr 2018 23:34)  T(F): 97.8 (17 Apr 2018 16:03), Max: 98.3 (16 Apr 2018 23:34)  HR: 61 (17 Apr 2018 16:03) (59 - 61)  BP: 137/64 (17 Apr 2018 16:03) (123/51 - 137/64)  BP(mean): --  RR: 16 (17 Apr 2018 16:03) (16 - 17)  SpO2: 100% (17 Apr 2018 16:03) (97% - 100%)    LABS/DIAGNOSTIC TESTS:                          9.5    8.0   )-----------( 426      ( 17 Apr 2018 07:21 )             32.4         04-17    142  |  109<H>  |  10  ----------------------------<  98  3.9   |  26  |  0.63    Ca    9.0      17 Apr 2018 07:21            CULTURES: .Blood Blood-Peripheral  04-14 @ 17:03   No growth to date.  --  --      .Blood Blood-Peripheral  04-12 @ 11:19   Growth in anaerobic bottle: Escherichia coli ESBL  "Due to technical problems, Proteus sp. will Not be reported as part of  the BCID panel until further notice"  --  Blood Culture PCR  Escherichia coli ESBL      .Urine Clean Catch (Midstream)  04-12 @ 10:30   >100,000 CFU/ml Escherichia coli ESBL  --  Escherichia coli ESBL            RADIOLOGY:      ROS:  [  ] UNABLE TO ELICIT 72y Female who grew out an ESBL E.coli from urine and blood cultures and so was switched to invanz on 4/14 but missed a dose on 4/15, her repeat blood cults were neg and so got her PICC line and is going to be dced to her NH tomorrow. she is D#3 of invanz. she has no complaints, no fevers, no diarrhea.    Meds:  ertapenem  IVPB 1000 milliGRAM(s) IV Intermittent every 24 hours    Allergies    No Known Allergies    Intolerances        VITALS:  Vital Signs Last 24 Hrs  T(C): 36.6 (17 Apr 2018 16:03), Max: 36.8 (16 Apr 2018 23:34)  T(F): 97.8 (17 Apr 2018 16:03), Max: 98.3 (16 Apr 2018 23:34)  HR: 61 (17 Apr 2018 16:03) (59 - 61)  BP: 137/64 (17 Apr 2018 16:03) (123/51 - 137/64)  BP(mean): --  RR: 16 (17 Apr 2018 16:03) (16 - 17)  SpO2: 100% (17 Apr 2018 16:03) (97% - 100%)    LABS/DIAGNOSTIC TESTS:                          9.5    8.0   )-----------( 426      ( 17 Apr 2018 07:21 )             32.4         04-17    142  |  109<H>  |  10  ----------------------------<  98  3.9   |  26  |  0.63    Ca    9.0      17 Apr 2018 07:21            CULTURES: .Blood Blood-Peripheral  04-14 @ 17:03   No growth to date.  --  --      .Blood Blood-Peripheral  04-12 @ 11:19   Growth in anaerobic bottle: Escherichia coli ESBL  "Due to technical problems, Proteus sp. will Not be reported as part of  the BCID panel until further notice"  --  Blood Culture PCR  Escherichia coli ESBL      .Urine Clean Catch (Midstream)  04-12 @ 10:30   >100,000 CFU/ml Escherichia coli ESBL  --  Escherichia coli ESBL            RADIOLOGY:      ROS:  [  ] UNABLE TO ELICIT

## 2018-04-17 NOTE — PROGRESS NOTE ADULT - SUBJECTIVE AND OBJECTIVE BOX
PGY 1 Note discussed with supervising resident and primary attending    Patient is a 72y old  Female who presents with a chief complaint of fever (12 Apr 2018 10:46)      INTERVAL HPI/OVERNIGHT EVENTS: patient seen and examined at bed, pt got PICC line today. offers no new complaints; current symptoms resolving    MEDICATIONS  (STANDING):  aspirin  chewable 81 milliGRAM(s) Oral daily  enoxaparin Injectable 40 milliGRAM(s) SubCutaneous daily  ergocalciferol 48455 Unit(s) Oral <User Schedule>  ertapenem  IVPB 1000 milliGRAM(s) IV Intermittent every 24 hours  escitalopram 10 milliGRAM(s) Oral daily  nicotine -   7 mG/24Hr(s) Patch 1 patch Transdermal daily  nystatin Cream 1 Application(s) Topical two times a day  oxybutynin 5 milliGRAM(s) Oral three times a day  senna 2 Tablet(s) Oral at bedtime  sodium chloride 0.9% lock flush 20 milliLiter(s) IV Push once    MEDICATIONS  (PRN):  acetaminophen   Tablet 650 milliGRAM(s) Oral every 6 hours PRN For Temp greater than 38 C (100.4 F)  sodium chloride 0.9% lock flush 10 milliLiter(s) IV Push every 1 hour PRN After each medication administration  sodium chloride 0.9% lock flush 10 milliLiter(s) IV Push every 12 hours PRN Lumen of catheter NOT used      __________________________________________________  REVIEW OF SYSTEMS:    CONSTITUTIONAL: No fever,   EYES: no acute visual disturbances  NECK: No pain or stiffness  RESPIRATORY: No cough; No shortness of breath  CARDIOVASCULAR: No chest pain, no palpitations  GASTROINTESTINAL: No pain. No nausea or vomiting; No diarrhea   NEUROLOGICAL: No headache or numbness, no tremors  MUSCULOSKELETAL: No joint pain, no muscle pain  GENITOURINARY: no dysuria, no frequency, no hesitancy  PSYCHIATRY: no depression , no anxiety  ALL OTHER  ROS negative        Vital Signs Last 24 Hrs  T(C): 36.6 (17 Apr 2018 16:03), Max: 36.8 (16 Apr 2018 23:34)  T(F): 97.8 (17 Apr 2018 16:03), Max: 98.3 (16 Apr 2018 23:34)  HR: 61 (17 Apr 2018 16:03) (59 - 61)  BP: 137/64 (17 Apr 2018 16:03) (123/51 - 137/64)  BP(mean): --  RR: 16 (17 Apr 2018 16:03) (16 - 17)  SpO2: 100% (17 Apr 2018 16:03) (97% - 100%)    ________________________________________________  PHYSICAL EXAM:  GENERAL: NAD  HEENT: Normocephalic;  conjunctivae and sclerae clear; moist mucous membranes;   NECK : supple  CHEST/LUNG: Clear to auscultation bilaterally with good air entry   HEART: S1 S2  regular; no murmurs, gallops or rubs  ABDOMEN: Soft, Nontender, Nondistended; Bowel sounds present  EXTREMITIES: no cyanosis; no edema; no calf tenderness  SKIN: warm and dry; no rash  NERVOUS SYSTEM:  Awake and alert; Oriented  to place, person and time ; no new deficits    _________________________________________________  LABS:                        9.5    8.0   )-----------( 426      ( 17 Apr 2018 07:21 )             32.4     04-17    142  |  109<H>  |  10  ----------------------------<  98  3.9   |  26  |  0.63    Ca    9.0      17 Apr 2018 07:21          CAPILLARY BLOOD GLUCOSE      POCT Blood Glucose.: 144 mg/dL (16 Apr 2018 21:27)        RADIOLOGY & ADDITIONAL TESTS:      Consultant(s) Notes Reviewed:   YES        Plan of care was discussed with patient and /or primary care giver; all questions and concerns were addressed and care was aligned with patient's wishes.

## 2018-04-18 ENCOUNTER — TRANSCRIPTION ENCOUNTER (OUTPATIENT)
Age: 73
End: 2018-04-18

## 2018-04-18 VITALS — WEIGHT: 118.83 LBS

## 2018-04-18 PROCEDURE — 82728 ASSAY OF FERRITIN: CPT

## 2018-04-18 PROCEDURE — 80053 COMPREHEN METABOLIC PANEL: CPT

## 2018-04-18 PROCEDURE — 36569 INSJ PICC 5 YR+ W/O IMAGING: CPT

## 2018-04-18 PROCEDURE — 81001 URINALYSIS AUTO W/SCOPE: CPT

## 2018-04-18 PROCEDURE — 80048 BASIC METABOLIC PNL TOTAL CA: CPT

## 2018-04-18 PROCEDURE — 84443 ASSAY THYROID STIM HORMONE: CPT

## 2018-04-18 PROCEDURE — 83605 ASSAY OF LACTIC ACID: CPT

## 2018-04-18 PROCEDURE — 83550 IRON BINDING TEST: CPT

## 2018-04-18 PROCEDURE — 82306 VITAMIN D 25 HYDROXY: CPT

## 2018-04-18 PROCEDURE — 77001 FLUOROGUIDE FOR VEIN DEVICE: CPT

## 2018-04-18 PROCEDURE — 87040 BLOOD CULTURE FOR BACTERIA: CPT

## 2018-04-18 PROCEDURE — 82607 VITAMIN B-12: CPT

## 2018-04-18 PROCEDURE — 71045 X-RAY EXAM CHEST 1 VIEW: CPT

## 2018-04-18 PROCEDURE — 84100 ASSAY OF PHOSPHORUS: CPT

## 2018-04-18 PROCEDURE — 80061 LIPID PANEL: CPT

## 2018-04-18 PROCEDURE — 85045 AUTOMATED RETICULOCYTE COUNT: CPT

## 2018-04-18 PROCEDURE — 87150 DNA/RNA AMPLIFIED PROBE: CPT

## 2018-04-18 PROCEDURE — 82962 GLUCOSE BLOOD TEST: CPT

## 2018-04-18 PROCEDURE — 85610 PROTHROMBIN TIME: CPT

## 2018-04-18 PROCEDURE — 83735 ASSAY OF MAGNESIUM: CPT

## 2018-04-18 PROCEDURE — 87186 SC STD MICRODIL/AGAR DIL: CPT

## 2018-04-18 PROCEDURE — 87581 M.PNEUMON DNA AMP PROBE: CPT

## 2018-04-18 PROCEDURE — C1751: CPT

## 2018-04-18 PROCEDURE — 87486 CHLMYD PNEUM DNA AMP PROBE: CPT

## 2018-04-18 PROCEDURE — 84466 ASSAY OF TRANSFERRIN: CPT

## 2018-04-18 PROCEDURE — 87633 RESP VIRUS 12-25 TARGETS: CPT

## 2018-04-18 PROCEDURE — 85027 COMPLETE CBC AUTOMATED: CPT

## 2018-04-18 PROCEDURE — 83036 HEMOGLOBIN GLYCOSYLATED A1C: CPT

## 2018-04-18 PROCEDURE — 99285 EMERGENCY DEPT VISIT HI MDM: CPT | Mod: 25

## 2018-04-18 PROCEDURE — 83690 ASSAY OF LIPASE: CPT

## 2018-04-18 PROCEDURE — 82746 ASSAY OF FOLIC ACID SERUM: CPT

## 2018-04-18 PROCEDURE — 87086 URINE CULTURE/COLONY COUNT: CPT

## 2018-04-18 PROCEDURE — 76937 US GUIDE VASCULAR ACCESS: CPT

## 2018-04-18 PROCEDURE — 87798 DETECT AGENT NOS DNA AMP: CPT

## 2018-04-18 RX ORDER — ACETAMINOPHEN 500 MG
2 TABLET ORAL
Qty: 0 | Refills: 0 | COMMUNITY
Start: 2018-04-18

## 2018-04-18 RX ORDER — NICOTINE POLACRILEX 2 MG
1 GUM BUCCAL
Qty: 0 | Refills: 0 | COMMUNITY

## 2018-04-18 RX ORDER — AMLODIPINE BESYLATE 2.5 MG/1
1 TABLET ORAL
Qty: 0 | Refills: 0 | COMMUNITY

## 2018-04-18 RX ORDER — ESCITALOPRAM OXALATE 10 MG/1
1 TABLET, FILM COATED ORAL
Qty: 0 | Refills: 0 | COMMUNITY
Start: 2018-04-18

## 2018-04-18 RX ORDER — ERTAPENEM SODIUM 1 G/1
1 INJECTION, POWDER, LYOPHILIZED, FOR SOLUTION INTRAMUSCULAR; INTRAVENOUS
Qty: 11 | Refills: 0 | OUTPATIENT
Start: 2018-04-18 | End: 2018-04-28

## 2018-04-18 RX ORDER — ESCITALOPRAM OXALATE 10 MG/1
1 TABLET, FILM COATED ORAL
Qty: 0 | Refills: 0 | COMMUNITY

## 2018-04-18 RX ORDER — NICOTINE POLACRILEX 2 MG
0 GUM BUCCAL
Qty: 0 | Refills: 0 | COMMUNITY
Start: 2018-04-18

## 2018-04-18 RX ORDER — NYSTATIN CREAM 100000 [USP'U]/G
1 CREAM TOPICAL
Qty: 0 | Refills: 0 | COMMUNITY
Start: 2018-04-18

## 2018-04-18 RX ORDER — ASPIRIN/CALCIUM CARB/MAGNESIUM 324 MG
1 TABLET ORAL
Qty: 0 | Refills: 0 | COMMUNITY
Start: 2018-04-18

## 2018-04-18 RX ORDER — ERTAPENEM SODIUM 1 G/1
1 INJECTION, POWDER, LYOPHILIZED, FOR SOLUTION INTRAMUSCULAR; INTRAVENOUS
Qty: 10 | Refills: 0 | OUTPATIENT
Start: 2018-04-18 | End: 2018-04-27

## 2018-04-18 RX ORDER — ERGOCALCIFEROL 1.25 MG/1
1 CAPSULE ORAL
Qty: 4 | Refills: 0 | OUTPATIENT
Start: 2018-04-18 | End: 2018-05-17

## 2018-04-18 RX ORDER — ASPIRIN/CALCIUM CARB/MAGNESIUM 324 MG
1 TABLET ORAL
Qty: 0 | Refills: 0 | COMMUNITY

## 2018-04-18 RX ORDER — CHOLECALCIFEROL (VITAMIN D3) 125 MCG
2 CAPSULE ORAL
Qty: 0 | Refills: 0 | COMMUNITY

## 2018-04-18 RX ORDER — OXYBUTYNIN CHLORIDE 5 MG
1 TABLET ORAL
Qty: 0 | Refills: 0 | COMMUNITY
Start: 2018-04-18

## 2018-04-18 RX ORDER — SENNA PLUS 8.6 MG/1
2 TABLET ORAL
Qty: 0 | Refills: 0 | COMMUNITY
Start: 2018-04-18

## 2018-04-18 RX ORDER — LANOLIN ALCOHOL/MO/W.PET/CERES
3 CREAM (GRAM) TOPICAL ONCE
Qty: 0 | Refills: 0 | Status: COMPLETED | OUTPATIENT
Start: 2018-04-18 | End: 2018-04-18

## 2018-04-18 RX ORDER — SENNA PLUS 8.6 MG/1
1 TABLET ORAL
Qty: 0 | Refills: 0 | COMMUNITY

## 2018-04-18 RX ORDER — ERTAPENEM SODIUM 1 G/1
0 INJECTION, POWDER, LYOPHILIZED, FOR SOLUTION INTRAMUSCULAR; INTRAVENOUS
Qty: 0 | Refills: 0 | COMMUNITY
Start: 2018-04-18

## 2018-04-18 RX ORDER — ACETAMINOPHEN 500 MG
2 TABLET ORAL
Qty: 0 | Refills: 0 | COMMUNITY

## 2018-04-18 RX ORDER — OXYBUTYNIN CHLORIDE 5 MG
1 TABLET ORAL
Qty: 0 | Refills: 0 | COMMUNITY

## 2018-04-18 RX ADMIN — Medication 1 PATCH: at 15:00

## 2018-04-18 RX ADMIN — Medication 5 MILLIGRAM(S): at 13:16

## 2018-04-18 RX ADMIN — ERTAPENEM SODIUM 220 MILLIGRAM(S): 1 INJECTION, POWDER, LYOPHILIZED, FOR SOLUTION INTRAMUSCULAR; INTRAVENOUS at 10:00

## 2018-04-18 RX ADMIN — ESCITALOPRAM OXALATE 10 MILLIGRAM(S): 10 TABLET, FILM COATED ORAL at 13:16

## 2018-04-18 RX ADMIN — NYSTATIN CREAM 1 APPLICATION(S): 100000 CREAM TOPICAL at 06:18

## 2018-04-18 RX ADMIN — Medication 5 MILLIGRAM(S): at 06:19

## 2018-04-18 RX ADMIN — Medication 1 PATCH: at 13:16

## 2018-04-18 RX ADMIN — ENOXAPARIN SODIUM 40 MILLIGRAM(S): 100 INJECTION SUBCUTANEOUS at 13:16

## 2018-04-18 RX ADMIN — Medication 3 MILLIGRAM(S): at 02:48

## 2018-04-18 RX ADMIN — Medication 81 MILLIGRAM(S): at 13:16

## 2018-04-18 NOTE — DISCHARGE NOTE ADULT - CONDITIONS AT DISCHARGE
Pt Awake and alert noted with periods of confusion and multiple attempts to get OOB unassisted, bed alarm in place. Pt breathing unlabored no c/o resp. distress chest pain or SOB. Pt with Dx. of UTI noted with left arm Single Lumen PICC no signs of infection or infiltration and insertion site for long-term antibiotic therapy. Abdomen soft non tender non distended BS present in all 4 quadrants Pt tolerating diet denies any N/V, assisted provided during meals. Cap refill less than 3 seconds pulses present in all extremities Pt with positive sensation and mobility no neurovascular deficit noted. Pt with stage I pressure ulcer to sacrum and heel T&R protocol in place. Pt for DC back to [facility] rehab Vital signs stable no distress noted. All needs of pt met thus far.

## 2018-04-18 NOTE — DISCHARGE NOTE ADULT - CARE PLAN
Principal Discharge DX:	Sepsis  Goal:	To treat the medical condition  Secondary Diagnosis:	Hypertension  Secondary Diagnosis:	Parkinson disease  Secondary Diagnosis:	UTI (urinary tract infection) Principal Discharge DX:	Sepsis  Goal:	To treat the medical condition  Assessment and plan of treatment:	You presented with Fever, leucocytosis 2/2 urinary tract infection with ESBL E. Coli. treated with IV antibiotics. Continue with Invanz 1Gram Q24 hrs for 11 days. Follow up with Primary care in a week.  Secondary Diagnosis:	Hypertension  Goal:	SBP< 140  Assessment and plan of treatment:	You presented with sepsis 2/2 UTI with borderline BP. your BP continue to be stable. Will hold your BP medication for now. Follow up with Primary care and Re- start Medications once infection is treated  Secondary Diagnosis:	Parkinson disease  Assessment and plan of treatment:	currently stable. continue with fall precautions  Secondary Diagnosis:	UTI (urinary tract infection)  Assessment and plan of treatment:	continue with IV antibiotics for 11 more days. maintain adequate hydration and good hygiene. Principal Discharge DX:	Sepsis  Goal:	To treat the medical condition  Assessment and plan of treatment:	You presented with Fever, leucocytosis 2/2 urinary tract infection with ESBL E. Coli. treated with IV antibiotics. Continue with Invanz 1Gram Q24 hrs for 11 days. Follow up with Primary care in a week.  Secondary Diagnosis:	Hypertension  Goal:	SBP< 140  Assessment and plan of treatment:	You presented with sepsis 2/2 UTI with borderline BP. your BP continue to be stable. Will hold your BP medication for now. Follow up with Primary care and Re- start Medications once infection is treated  Secondary Diagnosis:	Parkinson disease  Assessment and plan of treatment:	currently stable. continue with fall precautions  Secondary Diagnosis:	UTI (urinary tract infection)  Assessment and plan of treatment:	continue with IV antibiotics for 11 more days. maintain adequate hydration and good hygiene.  Secondary Diagnosis:	Carotid stenosis  Assessment and plan of treatment:	Patient has carotid stenosis, stable, continue with aspirin  Secondary Diagnosis:	Gait abnormality  Assessment and plan of treatment:	Fall precautions

## 2018-04-18 NOTE — DISCHARGE NOTE ADULT - HOSPITAL COURSE
Patient is a 72F from [facility], AAOx1, with PMH dementia, , depression, Parkinson's, HTN, CVA, L carotid stenosis, sepsis 2/2 UTI 6 mo ago, gait instability presenting with 3 day fever. Patient was seen in the ED, agitated, not cooperative with full interview. Denies SOB, palpitations, chest pain, nausea, vomiting, diarrhea or constipation and reports wanting to return to the NH and says she feels generalized malaise. Got 1mg versed and 1mg haldol in the ED. Further history unobtainable due to agitation. As per NH, s/p recent course of ceftin for bronchitis 3/25-4/4.     Patient was Patient is a 72F from [facility], AAOx1, with PMH dementia, , depression, Parkinson's, HTN, CVA, L carotid stenosis, sepsis 2/2 UTI 6 mo ago, gait instability presenting with 3 day fever. Patient was seen in the ED, agitated, not cooperative with full interview. Denies SOB, palpitations, chest pain, nausea, vomiting, diarrhea or constipation and reports wanting to return to the NH and says she feels generalized malaise. Got 1mg versed and 1mg haldol in the ED. Further history unobtainable due to agitation. As per NH, s/p recent course of ceftin for bronchitis 3/25-4/4.     Patient presented with fever, leucocytosis and UA positive, code sepsis was called patient treated with Iv Broad spectrum antibiotics. Urine cultures and Blood cultures showed ESBL E. Coli. Infectious disease Dr Gaytan evaluated the patient. Recommended Invanz 1 G Q 24 hrs for total of 12  days. PICC line was placed for IV antibiotics.   Repeat cultures were negative. patient been afebrile, HD stable, leucocytosis resolved.  Patient is been stable for discharge to the nursing home.   Plan of care discussed with patient daughter and the attending physician.

## 2018-04-18 NOTE — DISCHARGE NOTE ADULT - MEDICATION SUMMARY - MEDICATIONS TO CHANGE
I will SWITCH the dose or number of times a day I take the medications listed below when I get home from the hospital:    Vitamin D3 2000 intl units oral tablet  -- 2 tab(s) by mouth once a day

## 2018-04-18 NOTE — DISCHARGE NOTE ADULT - CARE PROVIDER_API CALL
Bret Carney (MB[employer]), Medicine  [address] [place] [address]  Phone: [phone]  Fax: [phone]

## 2018-04-18 NOTE — DIETITIAN INITIAL EVALUATION ADULT. - OTHER INFO
Patient seen for LOS x7days. Patient from [facility]. Visited pt, alert but confused & agitated, poor historian, per PCA, pt. not eating so well, consuming 40% of meals & tolerating, depending on her "moods", no reports of GI distress, encourage po intake with feeding assistance, skin intake. Discussed with MD/RN. Patient seen for LOS x7days. Patient from [facility]. Visited pt, alert but confused & agitated, poor historian, per PCA, pt. not eating so well, consuming 40% of meals & tolerating, depending on her "moods", no reports of GI distress, encourage po intake with feeding assistance, multiple pressure ulcers (stage-I) noted. Discussed with MD/RN.

## 2018-04-18 NOTE — DISCHARGE NOTE ADULT - PLAN OF CARE
To treat the medical condition You presented with Fever, leucocytosis 2/2 urinary tract infection with ESBL E. Coli. treated with IV antibiotics. Continue with Invanz 1Gram Q24 hrs for 11 days. Follow up with Primary care in a week. SBP< 140 You presented with sepsis 2/2 UTI with borderline BP. your BP continue to be stable. Will hold your BP medication for now. Follow up with Primary care and Re- start Medications once infection is treated currently stable. continue with fall precautions continue with IV antibiotics for 11 more days. maintain adequate hydration and good hygiene. Patient has carotid stenosis, stable, continue with aspirin Fall precautions

## 2018-04-18 NOTE — DISCHARGE NOTE ADULT - PATIENT PORTAL LINK FT
You can access the [facility] Patient Portal, offered by [facility], by registering with the following website: [email or web address]

## 2018-04-18 NOTE — DIETITIAN INITIAL EVALUATION ADULT. - MD RECOMMEND
Add Ensure Enlive 1 can TID (1050 Kcal, Protein 60 grams) to Mechanical Soft as medically feasible/po supplement

## 2018-04-18 NOTE — DISCHARGE NOTE ADULT - MEDICATION SUMMARY - MEDICATIONS TO TAKE
I will START or STAY ON the medications listed below when I get home from the hospital:    aspirin 81 mg oral tablet, chewable  -- 1 tab(s) by mouth once a day  -- Indication: For CVD    acetaminophen 325 mg oral tablet  -- 2 tab(s) by mouth every 6 hours, As needed, For Temp greater than 38 C (100.4 F)  -- Indication: For fever or pain     escitalopram 10 mg oral tablet  -- 1 tab(s) by mouth once a day  -- Indication: For Depression    ertapenem 1 g injection  -- 1 gram(s) injectable once a day   -- Indication: For UTI (urinary tract infection)    nystatin 100,000 units/g topical cream  -- 1 application on skin 2 times a day  -- Indication: For Skin rash     senna oral tablet  -- 2 tab(s) by mouth once a day (at bedtime)  -- Indication: For Constipation     nicotine 7 mg/24 hr transdermal film, extended release  --  by transdermal patch   -- Indication: For Smoking cessation     oxybutynin 5 mg oral tablet  -- 1 tab(s) by mouth 3 times a day  -- Indication: For Urinary incontinence    Multiple Vitamins oral tablet  -- 1 tab(s) by mouth once a day  -- Indication: For Prophylaxis    Vitamin B-100 oral tablet  -- 1 tab(s) by mouth once a day  -- Indication: For Supplement    Vitamin C 500 mg oral tablet  -- 1 tab(s) by mouth once a day  -- Indication: For Supplementation     ergocalciferol 50,000 intl units (1.25 mg) oral capsule  -- 1 cap(s) by mouth once a week  -- Indication: For Vit d deficiency

## 2018-04-18 NOTE — DIETITIAN INITIAL EVALUATION ADULT. - NUTRITION INTERVENTION
Meals and Snack/Medical Food Supplements/Collaboration and Referral of Nutrition Care/Feeding Assistance/Vitamin Meals and Snack/Medical Food Supplements/Collaboration and Referral of Nutrition Care/Vitamin/Feeding Assistance/Mineral

## 2018-04-18 NOTE — DIETITIAN INITIAL EVALUATION ADULT. - FACTORS AFF FOOD INTAKE
pain/agitation, Sepsis, Parkinson disease, UTI/difficulty with food procurement/preparation/other (specify)

## 2018-04-18 NOTE — DIETITIAN INITIAL EVALUATION ADULT. - PERTINENT LABORATORY DATA
04-17 Na142 mmol/L Glu 98 mg/dL K+ 3.9 mmol/L Cr  0.63 mg/dL BUN 10 mg/dL 04-14 Phos 2.6 mg/dL 04-13 Alb 2.9 g/dL<L> 04-13 [id number] 6.1 %<H> 04-13 Chol 130 mg/dL LDL 79 mg/dL HDL 26 mg/dL<L> Trig 125 mg/dL

## 2018-04-19 LAB
CULTURE RESULTS: SIGNIFICANT CHANGE UP
CULTURE RESULTS: SIGNIFICANT CHANGE UP
SPECIMEN SOURCE: SIGNIFICANT CHANGE UP
SPECIMEN SOURCE: SIGNIFICANT CHANGE UP

## 2018-06-13 NOTE — DIETITIAN INITIAL EVALUATION ADULT. - PHYSICAL APPEARANCE
Discharge Summary  Hospital Medicine    Admit Date: 6/11/2018    Date and Time: 6/13/20182:04 PM    Discharge Attending Physician: Trevor Koroma MD    Primary Care Physician: Diomedes Vega MD    Diagnoses:  Active Hospital Problems    Diagnosis  POA    *Upper GI bleed [K92.2]  Yes    Duodenitis [K29.80]  Unknown    CHF (congestive heart failure) [I50.9]  Yes    Nausea & vomiting [R11.2]  Yes    Transaminitis [R74.0]  Yes    Fever [R50.9]  Yes    History of smoking greater than 50 pack years, quit 5/2017, 60+ pack years [Z87.891]  Not Applicable    Alcohol consumption binge drinking [F10.10]  Yes    NSAID induced gastritis [K29.60, T39.395A]  Yes    CAD (coronary artery disease) [I25.10]  Yes    Cardiac pacemaker in situ [Z95.0]  Yes    Essential hypertension [I10]  Yes      Resolved Hospital Problems    Diagnosis Date Resolved POA   No resolved problems to display.     Discharged Condition: Good    Hospital Course:   47 y/o gentleman, who presented to the ED with c/o ABD pain and N/V.  He has a PMH of HTN, CAD, prior MI, and CHF with an estimated EF reportedly of 15%.  He reported that symptoms started 5 days ago and had been persistent since onset.  He states that he was evaluated at [facility] ED and a CT was performed which was reportedly negative for acute findings.  He was discharged home with PO pantoprazole.  He stated that discomfort was severe and in the epigastric regimen.  He reported minimal changes in symptoms since that time and endorsed seeing bright red blood in his vomit on the of presentation.  At this time, he c/o ongoing abd discomfort despite morphine administration.   He endorsed worsening edema (hands, ABD, and BLE).  He stated that he had not been able to take any of his home medications including his furosemide d/t the ongoing vomiting. He denied fever, chills, CP, SOB, diarrhea, or  symptoms. He is s/p pacer / AICD implantation (2013) which was placed while in  [place]. Patient was admitted to Hospitalist medicine service. Patient was evaluated by Dr. Allen. Patient was evaluated by GI specialist. Hemoglobin and hematocrit closely monitored. Patient required transfusion of PRBC. Patient under went endoscopic evaluation, results below. Hemoglobin and hematocrit remained stable, diet slowly advanced, patient tolerated diet. Patient was also evaluated by the . Patient's cardiac meds adjusted. Patient advised to start enteric coated ASA in a week. Cardiac medications started. Medication compliance discussed. Patient advised to avoid use of NSAIDs. Patient was discharged home in stable condition with following discharge plan of care.     Consults: Dr. Crane and Dr. Allen    Significant Diagnostic Studies:   CXR: Findings suggesting mild CHF.     EGD:   - Normal esophagus.                       - Z-line regular, 40 cm from the incisors.                       - Small hiatal hernia.                       - Gastritis. Biopsied.                       - Duodenitis. Biopsied.                       - Normal second portion of the duodenum.     ECHO:  · The left ventricle cavity is moderately dilated.  · Mitral valve shows moderate-to-severe regurgitation.  · Tricuspid valve shows mild regurgitation.  · Left ventricle ejection fraction is severely decreased at 25%  · Grade III (severe) left ventricular diastolic dysfunction consistent with restrictive physiology.  · LA pressure is elevated.  · Left atrium is mildly dilated.  · RA cavity size is normal.  · Normal central venous pressure (3 mm Hg).  ·   Microbiology Results (last 7 days)     Procedure Component Value Units Date/Time    Blood culture [[id number]] Collected:  06/12/18 0534    Order Status:  Completed Specimen:  Blood Updated:  06/13/18 1212     Blood Culture, Routine No Growth to date     Blood Culture, Routine No Growth to date    Blood culture [[id number]] Collected:  06/12/18 8230    Order Status:  Completed  Specimen:  Blood Updated:  06/13/18 1212     Blood Culture, Routine No Growth to date     Blood Culture, Routine No Growth to date        Special Treatments/Procedures: None  Disposition: Home or Self Care    Medications:  Reconciled Home Medications: Current Discharge Medication List      START taking these medications    Details   metoprolol tartrate (LOPRESSOR) 25 MG tablet Take 0.5 tablets (12.5 mg total) by mouth 2 (two) times daily.  Qty: 60 tablet, Refills: 0      ondansetron (ZOFRAN) 4 MG tablet Take 1 tablet (4 mg total) by mouth every 6 (six) hours as needed for Nausea.  Qty: 15 tablet, Refills: 0      rosuvastatin (CRESTOR) 20 MG tablet Take 1 tablet (20 mg total) by mouth every evening.  Qty: 30 tablet, Refills: 0      sucralfate (CARAFATE) 100 mg/mL suspension Take 10 mLs (1 g total) by mouth every 6 (six) hours.  Qty: 400 mL, Refills: 0      traMADol (ULTRAM) 50 mg tablet Take 1 tablet (50 mg total) by mouth every 6 (six) hours as needed for Pain.  Qty: 20 tablet, Refills: 0         CONTINUE these medications which have NOT CHANGED    Details   furosemide (LASIX) 40 MG tablet Take 40 mg by mouth 2 (two) times daily.      sacubitril-valsartan (ENTRESTO) 24-26 mg per tablet Take 1 tablet by mouth 2 (two) times daily.             Discharge Procedure Orders  Diet Cardiac   Scheduling Instructions: Patient to monitor daily weight, follow low salt diet and in case if gain more than 3 pounds in 24 hours, please call your doctor for further advice.     Activity as tolerated   Order Comments: Observe fall precautions.     Call MD for:   Order Comments: For worsening symptoms, chest pain, shortness of breath, increased abdominal pain, high grade fever, stroke or stroke like symptoms, immediately go to the nearest Emergency Room or call 911 as soon as possible.       Follow-up Information     Diomedes Vega MD In 1 week.    Specialty:  Internal Medicine  Contact information:  [address]  [facility] [address]  [phone]             Chinedu Crane MD In 2 weeks.    Specialty:  Cardiology  Contact information:  [address]  [address]  [place] [address] [phone]             Quan Allen MD In 2 weeks.    Specialty:  Gastroenterology  Contact information:  [address] [facility]  [address]  [place] [address] [phone]             Please follow up.    Contact information:  Please avoid use of NSAIDs such as Motrin, Ibuprofen or Aleve.                            debilitated/BMI 20/other (specify)

## 2018-09-22 ENCOUNTER — INPATIENT (INPATIENT)
Facility: HOSPITAL | Age: 73
LOS: 2 days | Discharge: NURSING, REHAB OR LONG-TERM CARE | DRG: 871 | End: 2018-09-25
Attending: INTERNAL MEDICINE | Admitting: INTERNAL MEDICINE
Payer: MEDICARE

## 2018-09-22 VITALS
DIASTOLIC BLOOD PRESSURE: 58 MMHG | SYSTOLIC BLOOD PRESSURE: 105 MMHG | TEMPERATURE: 99 F | OXYGEN SATURATION: 97 % | WEIGHT: 130.07 LBS | RESPIRATION RATE: 18 BRPM | HEART RATE: 70 BPM

## 2018-09-22 DIAGNOSIS — K59.00 CONSTIPATION, UNSPECIFIED: ICD-10-CM

## 2018-09-22 DIAGNOSIS — N39.0 URINARY TRACT INFECTION, SITE NOT SPECIFIED: ICD-10-CM

## 2018-09-22 DIAGNOSIS — E46 UNSPECIFIED PROTEIN-CALORIE MALNUTRITION: ICD-10-CM

## 2018-09-22 DIAGNOSIS — G20 PARKINSON'S DISEASE: ICD-10-CM

## 2018-09-22 DIAGNOSIS — Z29.9 ENCOUNTER FOR PROPHYLACTIC MEASURES, UNSPECIFIED: ICD-10-CM

## 2018-09-22 DIAGNOSIS — F32.9 MAJOR DEPRESSIVE DISORDER, SINGLE EPISODE, UNSPECIFIED: ICD-10-CM

## 2018-09-22 DIAGNOSIS — Z90.49 ACQUIRED ABSENCE OF OTHER SPECIFIED PARTS OF DIGESTIVE TRACT: Chronic | ICD-10-CM

## 2018-09-22 PROBLEM — F03.90 UNSPECIFIED DEMENTIA WITHOUT BEHAVIORAL DISTURBANCE: Chronic | Status: ACTIVE | Noted: 2018-04-12

## 2018-09-22 PROBLEM — R32 UNSPECIFIED URINARY INCONTINENCE: Chronic | Status: ACTIVE | Noted: 2018-04-12

## 2018-09-22 PROBLEM — I65.29 OCCLUSION AND STENOSIS OF UNSPECIFIED CAROTID ARTERY: Chronic | Status: ACTIVE | Noted: 2018-04-12

## 2018-09-22 PROBLEM — R26.9 UNSPECIFIED ABNORMALITIES OF GAIT AND MOBILITY: Chronic | Status: ACTIVE | Noted: 2018-04-12

## 2018-09-22 PROBLEM — I10 ESSENTIAL (PRIMARY) HYPERTENSION: Chronic | Status: ACTIVE | Noted: 2018-04-12

## 2018-09-22 LAB
ALBUMIN SERPL ELPH-MCNC: 3.3 G/DL — LOW (ref 3.5–5)
ALP SERPL-CCNC: 118 U/L — SIGNIFICANT CHANGE UP (ref 40–120)
ALT FLD-CCNC: 22 U/L DA — SIGNIFICANT CHANGE UP (ref 10–60)
ANION GAP SERPL CALC-SCNC: 8 MMOL/L — SIGNIFICANT CHANGE UP (ref 5–17)
APPEARANCE UR: ABNORMAL
AST SERPL-CCNC: 18 U/L — SIGNIFICANT CHANGE UP (ref 10–40)
BASOPHILS # BLD AUTO: 0.1 K/UL — SIGNIFICANT CHANGE UP (ref 0–0.2)
BASOPHILS NFR BLD AUTO: 1 % — SIGNIFICANT CHANGE UP (ref 0–2)
BILIRUB SERPL-MCNC: 0.6 MG/DL — SIGNIFICANT CHANGE UP (ref 0.2–1.2)
BILIRUB UR-MCNC: NEGATIVE — SIGNIFICANT CHANGE UP
BUN SERPL-MCNC: 23 MG/DL — HIGH (ref 7–18)
CALCIUM SERPL-MCNC: 8.9 MG/DL — SIGNIFICANT CHANGE UP (ref 8.4–10.5)
CHLORIDE SERPL-SCNC: 101 MMOL/L — SIGNIFICANT CHANGE UP (ref 96–108)
CO2 SERPL-SCNC: 29 MMOL/L — SIGNIFICANT CHANGE UP (ref 22–31)
COLOR SPEC: YELLOW — SIGNIFICANT CHANGE UP
CREAT SERPL-MCNC: 1.04 MG/DL — SIGNIFICANT CHANGE UP (ref 0.5–1.3)
DIFF PNL FLD: ABNORMAL
EOSINOPHIL # BLD AUTO: 0.1 K/UL — SIGNIFICANT CHANGE UP (ref 0–0.5)
EOSINOPHIL NFR BLD AUTO: 0.6 % — SIGNIFICANT CHANGE UP (ref 0–6)
GLUCOSE SERPL-MCNC: 125 MG/DL — HIGH (ref 70–99)
GLUCOSE UR QL: NEGATIVE — SIGNIFICANT CHANGE UP
HCT VFR BLD CALC: 41.3 % — SIGNIFICANT CHANGE UP (ref 34.5–45)
HGB BLD-MCNC: 12.5 G/DL — SIGNIFICANT CHANGE UP (ref 11.5–15.5)
KETONES UR-MCNC: NEGATIVE — SIGNIFICANT CHANGE UP
LACTATE SERPL-SCNC: 1.3 MMOL/L — SIGNIFICANT CHANGE UP (ref 0.7–2)
LEUKOCYTE ESTERASE UR-ACNC: ABNORMAL
LIDOCAIN IGE QN: 29 U/L — LOW (ref 73–393)
LYMPHOCYTES # BLD AUTO: 1.9 K/UL — SIGNIFICANT CHANGE UP (ref 1–3.3)
LYMPHOCYTES # BLD AUTO: 18.8 % — SIGNIFICANT CHANGE UP (ref 13–44)
MCHC RBC-ENTMCNC: 27.4 PG — SIGNIFICANT CHANGE UP (ref 27–34)
MCHC RBC-ENTMCNC: 30.2 GM/DL — LOW (ref 32–36)
MCV RBC AUTO: 90.6 FL — SIGNIFICANT CHANGE UP (ref 80–100)
MONOCYTES # BLD AUTO: 0.8 K/UL — SIGNIFICANT CHANGE UP (ref 0–0.9)
MONOCYTES NFR BLD AUTO: 7.9 % — SIGNIFICANT CHANGE UP (ref 2–14)
NEUTROPHILS # BLD AUTO: 7.1 K/UL — SIGNIFICANT CHANGE UP (ref 1.8–7.4)
NEUTROPHILS NFR BLD AUTO: 71.7 % — SIGNIFICANT CHANGE UP (ref 43–77)
NITRITE UR-MCNC: NEGATIVE — SIGNIFICANT CHANGE UP
PH UR: 5 — SIGNIFICANT CHANGE UP (ref 5–8)
PLATELET # BLD AUTO: 274 K/UL — SIGNIFICANT CHANGE UP (ref 150–400)
POTASSIUM SERPL-MCNC: 3.3 MMOL/L — LOW (ref 3.5–5.3)
POTASSIUM SERPL-SCNC: 3.3 MMOL/L — LOW (ref 3.5–5.3)
PROT SERPL-MCNC: 7.3 G/DL — SIGNIFICANT CHANGE UP (ref 6–8.3)
PROT UR-MCNC: 30 MG/DL
RAPID RVP RESULT: SIGNIFICANT CHANGE UP
RBC # BLD: 4.56 M/UL — SIGNIFICANT CHANGE UP (ref 3.8–5.2)
RBC # FLD: 13.3 % — SIGNIFICANT CHANGE UP (ref 10.3–14.5)
SODIUM SERPL-SCNC: 138 MMOL/L — SIGNIFICANT CHANGE UP (ref 135–145)
SP GR SPEC: 1.01 — SIGNIFICANT CHANGE UP (ref 1.01–1.02)
TROPONIN I SERPL-MCNC: <0.015 NG/ML — SIGNIFICANT CHANGE UP (ref 0–0.04)
UROBILINOGEN FLD QL: NEGATIVE — SIGNIFICANT CHANGE UP
WBC # BLD: 9.9 K/UL — SIGNIFICANT CHANGE UP (ref 3.8–10.5)
WBC # FLD AUTO: 9.9 K/UL — SIGNIFICANT CHANGE UP (ref 3.8–10.5)

## 2018-09-22 PROCEDURE — 74177 CT ABD & PELVIS W/CONTRAST: CPT | Mod: 26

## 2018-09-22 PROCEDURE — 99285 EMERGENCY DEPT VISIT HI MDM: CPT

## 2018-09-22 PROCEDURE — 71045 X-RAY EXAM CHEST 1 VIEW: CPT | Mod: 26

## 2018-09-22 RX ORDER — MEROPENEM 1 G/30ML
1000 INJECTION INTRAVENOUS ONCE
Qty: 0 | Refills: 0 | Status: DISCONTINUED | OUTPATIENT
Start: 2018-09-22 | End: 2018-09-22

## 2018-09-22 RX ORDER — POTASSIUM CHLORIDE 20 MEQ
20 PACKET (EA) ORAL
Qty: 0 | Refills: 0 | Status: COMPLETED | OUTPATIENT
Start: 2018-09-22 | End: 2018-09-22

## 2018-09-22 RX ORDER — SODIUM CHLORIDE 9 MG/ML
1000 INJECTION INTRAMUSCULAR; INTRAVENOUS; SUBCUTANEOUS ONCE
Qty: 0 | Refills: 0 | Status: COMPLETED | OUTPATIENT
Start: 2018-09-22 | End: 2018-09-22

## 2018-09-22 RX ORDER — MEROPENEM 1 G/30ML
INJECTION INTRAVENOUS
Qty: 0 | Refills: 0 | Status: DISCONTINUED | OUTPATIENT
Start: 2018-09-22 | End: 2018-09-22

## 2018-09-22 RX ORDER — ERTAPENEM SODIUM 1 G/1
INJECTION, POWDER, LYOPHILIZED, FOR SOLUTION INTRAMUSCULAR; INTRAVENOUS
Qty: 0 | Refills: 0 | Status: DISCONTINUED | OUTPATIENT
Start: 2018-09-22 | End: 2018-09-25

## 2018-09-22 RX ORDER — VALPROIC ACID (AS SODIUM SALT) 250 MG/5ML
350 SOLUTION, ORAL ORAL
Qty: 0 | Refills: 0 | Status: DISCONTINUED | OUTPATIENT
Start: 2018-09-22 | End: 2018-09-25

## 2018-09-22 RX ORDER — ERTAPENEM SODIUM 1 G/1
1000 INJECTION, POWDER, LYOPHILIZED, FOR SOLUTION INTRAMUSCULAR; INTRAVENOUS ONCE
Qty: 0 | Refills: 0 | Status: COMPLETED | OUTPATIENT
Start: 2018-09-22 | End: 2018-09-22

## 2018-09-22 RX ORDER — ERTAPENEM SODIUM 1 G/1
1000 INJECTION, POWDER, LYOPHILIZED, FOR SOLUTION INTRAMUSCULAR; INTRAVENOUS EVERY 24 HOURS
Qty: 0 | Refills: 0 | Status: DISCONTINUED | OUTPATIENT
Start: 2018-09-23 | End: 2018-09-25

## 2018-09-22 RX ORDER — CHOLECALCIFEROL (VITAMIN D3) 125 MCG
1000 CAPSULE ORAL DAILY
Qty: 0 | Refills: 0 | Status: DISCONTINUED | OUTPATIENT
Start: 2018-09-22 | End: 2018-09-24

## 2018-09-22 RX ORDER — PREGABALIN 225 MG/1
1000 CAPSULE ORAL DAILY
Qty: 0 | Refills: 0 | Status: DISCONTINUED | OUTPATIENT
Start: 2018-09-22 | End: 2018-09-25

## 2018-09-22 RX ORDER — ACETAMINOPHEN 500 MG
650 TABLET ORAL EVERY 6 HOURS
Qty: 0 | Refills: 0 | Status: DISCONTINUED | OUTPATIENT
Start: 2018-09-22 | End: 2018-09-25

## 2018-09-22 RX ORDER — ENOXAPARIN SODIUM 100 MG/ML
30 INJECTION SUBCUTANEOUS DAILY
Qty: 0 | Refills: 0 | Status: DISCONTINUED | OUTPATIENT
Start: 2018-09-22 | End: 2018-09-25

## 2018-09-22 RX ORDER — MAGNESIUM HYDROXIDE 400 MG/1
30 TABLET, CHEWABLE ORAL DAILY
Qty: 0 | Refills: 0 | Status: DISCONTINUED | OUTPATIENT
Start: 2018-09-22 | End: 2018-09-25

## 2018-09-22 RX ORDER — ASPIRIN/CALCIUM CARB/MAGNESIUM 324 MG
81 TABLET ORAL DAILY
Qty: 0 | Refills: 0 | Status: DISCONTINUED | OUTPATIENT
Start: 2018-09-22 | End: 2018-09-25

## 2018-09-22 RX ORDER — MORPHINE SULFATE 50 MG/1
2 CAPSULE, EXTENDED RELEASE ORAL ONCE
Qty: 0 | Refills: 0 | Status: DISCONTINUED | OUTPATIENT
Start: 2018-09-22 | End: 2018-09-22

## 2018-09-22 RX ORDER — CEFOTETAN DISODIUM 1 G
1 VIAL (EA) INJECTION ONCE
Qty: 0 | Refills: 0 | Status: DISCONTINUED | OUTPATIENT
Start: 2018-09-22 | End: 2018-09-22

## 2018-09-22 RX ORDER — LACTULOSE 10 G/15ML
10 SOLUTION ORAL DAILY
Qty: 0 | Refills: 0 | Status: DISCONTINUED | OUTPATIENT
Start: 2018-09-22 | End: 2018-09-23

## 2018-09-22 RX ORDER — MULTIVIT-MIN/FERROUS GLUCONATE 9 MG/15 ML
1 LIQUID (ML) ORAL DAILY
Qty: 0 | Refills: 0 | Status: DISCONTINUED | OUTPATIENT
Start: 2018-09-22 | End: 2018-09-25

## 2018-09-22 RX ORDER — SODIUM CHLORIDE 9 MG/ML
1000 INJECTION INTRAMUSCULAR; INTRAVENOUS; SUBCUTANEOUS
Qty: 0 | Refills: 0 | Status: DISCONTINUED | OUTPATIENT
Start: 2018-09-22 | End: 2018-09-22

## 2018-09-22 RX ORDER — SODIUM CHLORIDE 9 MG/ML
1000 INJECTION, SOLUTION INTRAVENOUS
Qty: 0 | Refills: 0 | Status: DISCONTINUED | OUTPATIENT
Start: 2018-09-22 | End: 2018-09-25

## 2018-09-22 RX ORDER — ESCITALOPRAM OXALATE 10 MG/1
10 TABLET, FILM COATED ORAL DAILY
Qty: 0 | Refills: 0 | Status: DISCONTINUED | OUTPATIENT
Start: 2018-09-22 | End: 2018-09-25

## 2018-09-22 RX ORDER — CEFOXITIN 1 G/1
1 INJECTION, POWDER, FOR SOLUTION INTRAVENOUS ONCE
Qty: 0 | Refills: 0 | Status: COMPLETED | OUTPATIENT
Start: 2018-09-22 | End: 2018-09-22

## 2018-09-22 RX ORDER — MEROPENEM 1 G/30ML
1000 INJECTION INTRAVENOUS EVERY 8 HOURS
Qty: 0 | Refills: 0 | Status: DISCONTINUED | OUTPATIENT
Start: 2018-09-22 | End: 2018-09-22

## 2018-09-22 RX ADMIN — ERTAPENEM SODIUM 120 MILLIGRAM(S): 1 INJECTION, POWDER, LYOPHILIZED, FOR SOLUTION INTRAMUSCULAR; INTRAVENOUS at 16:01

## 2018-09-22 RX ADMIN — Medication 350 MILLIGRAM(S): at 18:33

## 2018-09-22 RX ADMIN — Medication 20 MILLIEQUIVALENT(S): at 21:23

## 2018-09-22 RX ADMIN — MORPHINE SULFATE 2 MILLIGRAM(S): 50 CAPSULE, EXTENDED RELEASE ORAL at 13:19

## 2018-09-22 RX ADMIN — CEFOXITIN 100 GRAM(S): 1 INJECTION, POWDER, FOR SOLUTION INTRAVENOUS at 12:22

## 2018-09-22 RX ADMIN — Medication 20 MILLIEQUIVALENT(S): at 15:49

## 2018-09-22 RX ADMIN — SODIUM CHLORIDE 90 MILLILITER(S): 9 INJECTION, SOLUTION INTRAVENOUS at 16:01

## 2018-09-22 RX ADMIN — Medication 20 MILLIEQUIVALENT(S): at 21:22

## 2018-09-22 RX ADMIN — MORPHINE SULFATE 2 MILLIGRAM(S): 50 CAPSULE, EXTENDED RELEASE ORAL at 13:50

## 2018-09-22 RX ADMIN — SODIUM CHLORIDE 1000 MILLILITER(S): 9 INJECTION INTRAMUSCULAR; INTRAVENOUS; SUBCUTANEOUS at 10:40

## 2018-09-22 RX ADMIN — SODIUM CHLORIDE 1000 MILLILITER(S): 9 INJECTION INTRAMUSCULAR; INTRAVENOUS; SUBCUTANEOUS at 11:32

## 2018-09-22 NOTE — H&P ADULT - RS GEN PE MLT RESP DETAILS PC
no chest wall tenderness/no rhonchi/breath sounds equal/no rales/respirations non-labored/no wheezes

## 2018-09-22 NOTE — ED PROVIDER NOTE - OBJECTIVE STATEMENT
71 y/o F with a PMHx of Dementia, Parkinson Disease, HTN, Carotid Stenosis, Gait abnormality, Depression, HTN, Urinary incontinence, prior stroke, urosepsis and no significant PSHx presents to ED by nursing home to rule out sepsis. Pt reported with generalized weakness, lethargy, fever. Temperature reported at nursing home 98.4. Pt is poor historian suspected due to Dementia. Pt states she is here for lower back pain x 1 month. Denies any injury/fall, vomiting, diarrhea, CP, SOB. NKDA. (Nursing Home Doctor: Juana)

## 2018-09-22 NOTE — H&P ADULT - HISTORY OF PRESENT ILLNESS
Patient is a 72F from [facility], AAOx1, Bed bound, with PMH dementia, , depression, Parkinson's, HTN, CVA, L carotid stenosis, UTI with ESBL, gait instability, Urinary incontinence presenting with 1 day fever. Patient is poor historian Patient is a 72F from [facility], AAOx1, Bed bound, with PMH dementia, , depression, Parkinson's, HTN, CVA, L carotid stenosis, UTI with ESBL, gait instability, Urinary incontinence presenting with 1 day fever. Patient is poor historian, History obatined from ED physican and NH nurse over phone. As per NH patient had episode of High grade fever 103 F and she was hypotensive. Patient received tylenol at NH and fever was resolved. Patient was discharged 6 months ago with sepsis 2/2 UTI with ESBL on IV Invanz. Patient sent to hospital concern for sepsis. They reported patient was feeling very weak, Tired and more lethargic. Patient is bedbound, AAox1 and has dementia, moves with assistance but know she feels more weak. Patient has poor appetite. Denies any cough, SOB, chest pain, nausea, vomiting, diarrhea, abdominal pain, muscle pain, joint swelling, skin rash or skin breakdown.      NKDA   social Hx from NH, denies smoking, alcohol abuse or IVDA  surgical Hx no known Surgical Hx   family Hx no known family Hx Patient is a 72F from [facility], AAOx1, Bed bound, with PMH dementia, , depression, Parkinson's, HTN, CVA, L carotid stenosis, UTI with ESBL, gait instability, Urinary incontinence presenting with 1 day fever. Patient is poor historian, History obatined from ED physican and NH nurse over phone. As per NH patient had episode of High grade fever 103 F and she was hypotensive. Patient received tylenol at NH and fever was resolved. Patient was discharged 6 months ago with sepsis 2/2 UTI with ESBL on IV Invanz. Patient sent to hospital concern for sepsis. They reported patient was feeling very weak, Tired and more lethargic. Patient is bedbound, AAox1 and has dementia, moves with assistance but know she feels more weak. Patient has poor appetite. Denies any cough, SOB, chest pain, nausea, vomiting, diarrhea, abdominal pain, muscle pain, joint swelling, skin rash or skin breakdown. Patient has urinary incontinence unable to tell if she has any urinary complaints. she complaining of pain but not able to localized.      NKDA   social Hx from NH, denies smoking, alcohol abuse or IVDA  surgical Hx cholecystectomy   family Hx no known family Hx

## 2018-09-22 NOTE — H&P ADULT - PROBLEM SELECTOR PLAN 5
IMPROVE VTE Individual Risk Assessment          RISK                                                          Points  [  ] Previous VTE                                                3  [  ] Thrombophilia                                             2  [  ] Lower limb paralysis                                   2        (unable to hold up >15 seconds)    [  ] Current Cancer                                             2         (within 6 months)  [ x ] Immobilization > 24 hrs                              1  [  ] ICU/CCU stay > 24 hours                             1  [x  ] Age > 60                                                         1    IMPROVE VTE Score: VTE score 2  Lovenox for DVT prophylaxis patient has poor appetite   Nutritional consult  supplement with protein and multi vitamin

## 2018-09-22 NOTE — H&P ADULT - NSHPPHYSICALEXAM_GEN_ALL_CORE
ICU Vital Signs Last 24 Hrs  T(C): 37.4 (22 Sep 2018 11:00), Max: 37.4 (22 Sep 2018 11:00)  T(F): 99.3 (22 Sep 2018 11:00), Max: 99.3 (22 Sep 2018 11:00)  HR: 70 (22 Sep 2018 09:41) (70 - 70)  BP: 105/58 (22 Sep 2018 09:41) (105/58 - 105/58)  BP(mean): --  ABP: --  ABP(mean): --  RR: 18 (22 Sep 2018 09:41) (18 - 18)  SpO2: 97% (22 Sep 2018 09:41) (97% - 97%)

## 2018-09-22 NOTE — ED PROVIDER NOTE - MEDICAL DECISION MAKING DETAILS
Elderly pt with Dementia limited historian. Sent from nursing home to rule out sepsis. However rectal temperature not consistent with sepsis. Due to non-specific sxs and abd tenderness will order CT abd/pelvis,  labs urine analysis and culture of blood and urine. Re-evaluate

## 2018-09-22 NOTE — H&P ADULT - ASSESSMENT
Patient is a 72F from [facility], AAOx1, Bed bound, with PMH dementia, , depression, Parkinson's, HTN, CVA, L carotid stenosis, UTI with ESBL, gait instability, Urinary incontinence presenting with 1 day fever. Patient is poor historian, History obatined from ED physican and NH nurse over phone. As per NH patient had episode of High grade fever 103 F and she was hypotensive. Patient received tylenol at NH and fever was resolved. Patient was discharged 6 months ago with sepsis 2/2 UTI with ESBL on IV Invanz. Patient sent to hospital concern for sepsis. They reported patient was feeling very weak, Tired and more lethargic. Patient is bedbound, AAox1 and has dementia, moves with assistance but know she feels more weak. Patient has poor appetite. Denies any cough, SOB, chest pain, nausea, vomiting, diarrhea, abdominal pain, muscle pain, joint swelling, skin rash or skin breakdown. Patient has urinary incontinence unable to tell if she has any urinary complaints. she complaining of pain but not able to localized.      On admission pt  was afebrile, /58, HR 70, O2 sat 97 room air, CBC no leucocytosis, normal renal functions. Normal lactate  CT abd showed pancreatic ductal dilatation. Hyperdense foci are identified in the region of the pancreas head; differential considerations would include pancreatic parenchymal calcifications, No acute pathology   CXR  No infiltrate.  UA positive for WBC > 50, Moderate esterase   patient was admitted with UTI

## 2018-09-22 NOTE — H&P ADULT - PROBLEM SELECTOR PLAN 3
stable  patient is AAOx1   lethargy likely due to infection  fall and aspiration precautions continue with Lexapro and depakene

## 2018-09-22 NOTE — H&P ADULT - PROBLEM SELECTOR PLAN 4
patient has poor appetite   Nutritional consult  supplement with protein and multi vitamin stable  patient is AAOx1   lethargy likely due to infection  fall and aspiration precautions

## 2018-09-22 NOTE — H&P ADULT - NSHPLABSRESULTS_GEN_ALL_CORE
12.5   9.9   )-----------( 274      ( 22 Sep 2018 10:46 )             41.3       09-22    138  |  101  |  23<H>  ----------------------------<  125<H>  3.3<L>   |  29  |  1.04    Ca    8.9      22 Sep 2018 10:46    TPro  7.3  /  Alb  3.3<L>  /  TBili  0.6  /  DBili  x   /  AST  18  /  ALT  22  /  AlkPhos  118  09-22      EXAM:  CT ABDOMEN AND PELVIS OC IC                            PROCEDURE DATE:  09/22/2018          INTERPRETATION:  CLINICAL HISTORY:  Abdominal and right flank pain, fever    Multiple axial images of the abdomen and pelvis were obtained from the   lung bases through pubic symphysis without the administration of oral   contrast. 90 cc of Omnipaque 350 was administered intravenously without   complication. Reformatted coronal and sagittal images are submitted.    COMPARISON: None    FINDINGS:    The liver is normal in size. Generalized decreased attenuation of the   hepatic parenchyma suggests hepatic parenchymal fatty infiltration. No   focal hepatic masses are identified. There is no evidence for   intrahepatic or extrahepatic biliary dilatation. The patient is status   post cholecystectomy.    The spleen and adrenal glands are unremarkable. There is pancreatic   ductal dilatation. Hyperdense foci are identified in the region of the   pancreas head; differential considerations would include pancreatic   parenchymal calcifications related to chronic calcific pancreatitis   versus calcifications within the pancreatic duct or extrahepatic CBD.   Correlate clinically. Consider MRCP evaluation if the patient is able to   undergo that examination. No discrete pancreatic masses are identified.    There is no evidence for bilateral hydronephrosis, renal calculi or   suspicious cystic/solid abnormalities of the renal parenchyma. Focal   ectasia of the infrarenal abdominal aorta measures3.0 cm. No abnormally   enlarged retroperitoneal or pelvic lymphadenopathy is noted.    Fecal material is scattered throughout the colon. There is no evidence   for mechanical bowel obstruction. No colonic wall thickening is   identified. There is noevidence for free intraperitoneal air or fluid.   The appendix is not well visualized. Note is made of a small hiatal   hernia.    The patient is status post right hip arthroplasty resulting impingement   hardening artifact within the pelvis; no gross abnormalities of the   uterus or bladder demonstrated.    Imaging of the lung bases demonstrates nodular thickening along the   medial aspect of the right hemidiaphragm pleural surface. Degenerative   changes of the thoracic and lumbar spine noted.    IMPRESSION:  Patient status post cholecystectomy. There is pancreatic   ductal dilatation. Hyperdense foci are identified in the region of the   pancreas head; differential considerations would include pancreatic   parenchymal calcifications related to chronic calcific pancreatitis   versus calcifications within the pancreatic duct or extrahepatic CBD.   Correlate clinically. Consider MRCP evaluation if the patient is able to   undergo that examination.  Focal ectasia of the infrarenal abdominal   aorta measures 3.0 cm.

## 2018-09-22 NOTE — ED ADULT NURSE REASSESSMENT NOTE - NS ED NURSE REASSESS COMMENT FT1
pt sleeping not in acute distress,with saline lock intact.report was given to 620 as per nurse nunez. waiting for transport.

## 2018-09-22 NOTE — H&P ADULT - PROBLEM SELECTOR PLAN 2
continue with Lexapro and depakene CT abd Fecal material is scattered throughout the colon. There is no evidence   for mechanical bowel obstruction. No colonic wall thickening  will give Lactulose and Milk of magnesia

## 2018-09-22 NOTE — H&P ADULT - PROBLEM SELECTOR PLAN 1
patient came with fever, weakness, tiredness, Lethargy   Afebrile, HD stable , no leucocytosis, normal renal functions, normal lactate   UA positive for WBC >50 and esterase positive   CT Ab neg for acute pathology   CXR no consolidation or Infiltrate   f/u urine and Blood cultures   f/u RVP   Given h/o ESBL in urine   s/p 1 l NS bolus and Iv cefoxitin   will start on meropenem 1g Q12 hrs  ID consult   Gentle Hydration patient came with fever, weakness, tiredness, Lethargy   Afebrile, HD stable , no leucocytosis, normal renal functions, normal lactate   UA positive for WBC >50 and esterase positive   CT Ab neg for acute pathology   CXR no consolidation or Infiltrate   f/u urine and Blood cultures   f/u RVP   Given h/o ESBL in urine   s/p 1 l NS bolus and Iv cefoxitin   will start on Invanz 1 gram Q 24 hrs  ID consult   Gentle Hydration patient came with fever, weakness, tiredness, Lethargy   Afebrile, HD stable , no leucocytosis, normal renal functions, normal lactate   UA positive for WBC >50 and esterase positive   CT Ab neg for acute pathology   CXR no consolidation or Infiltrate   f/u urine and Blood cultures   f/u RVP   Given h/o ESBL in urine   s/p 1 l NS bolus and Iv cefoxitin   will start on Invanz 1 gram Q 24 hrs  ID consult Dr miller  Gentle Hydration

## 2018-09-22 NOTE — ED ADULT NURSE NOTE - OBJECTIVE STATEMENT
Pt was sent from NH for r/o sepsis  Pt is afebrile , c/co pain to mid upper back, denies falls//trauma

## 2018-09-22 NOTE — ED ADULT NURSE NOTE - NSIMPLEMENTINTERV_GEN_ALL_ED
Implemented All Fall with Harm Risk Interventions:  [place] to call system. Call bell, personal items and telephone within reach. Instruct patient to call for assistance. Room bathroom lighting operational. Non-slip footwear when patient is off stretcher. Physically safe environment: no spills, clutter or unnecessary equipment. Stretcher in lowest position, wheels locked, appropriate side rails in place. Provide visual cue, wrist band, yellow gown, etc. Monitor gait and stability. Monitor for mental status changes and reorient to person, place, and time. Review medications for side effects contributing to fall risk. Reinforce activity limits and safety measures with patient and family. Provide visual clues: red socks.

## 2018-09-22 NOTE — H&P ADULT - PROBLEM SELECTOR PLAN 6
IMPROVE VTE Individual Risk Assessment          RISK                                                          Points  [  ] Previous VTE                                                3  [  ] Thrombophilia                                             2  [  ] Lower limb paralysis                                   2        (unable to hold up >15 seconds)    [  ] Current Cancer                                             2         (within 6 months)  [ x ] Immobilization > 24 hrs                              1  [  ] ICU/CCU stay > 24 hours                             1  [x  ] Age > 60                                                         1    IMPROVE VTE Score: VTE score 2  Lovenox for DVT prophylaxis

## 2018-09-23 LAB
ANION GAP SERPL CALC-SCNC: 8 MMOL/L — SIGNIFICANT CHANGE UP (ref 5–17)
BASOPHILS # BLD AUTO: 0.1 K/UL — SIGNIFICANT CHANGE UP (ref 0–0.2)
BASOPHILS NFR BLD AUTO: 1.2 % — SIGNIFICANT CHANGE UP (ref 0–2)
BUN SERPL-MCNC: 20 MG/DL — HIGH (ref 7–18)
CALCIUM SERPL-MCNC: 9 MG/DL — SIGNIFICANT CHANGE UP (ref 8.4–10.5)
CHLORIDE SERPL-SCNC: 106 MMOL/L — SIGNIFICANT CHANGE UP (ref 96–108)
CHOLEST SERPL-MCNC: 127 MG/DL — SIGNIFICANT CHANGE UP (ref 10–199)
CO2 SERPL-SCNC: 25 MMOL/L — SIGNIFICANT CHANGE UP (ref 22–31)
CREAT SERPL-MCNC: 0.66 MG/DL — SIGNIFICANT CHANGE UP (ref 0.5–1.3)
EOSINOPHIL # BLD AUTO: 0.2 K/UL — SIGNIFICANT CHANGE UP (ref 0–0.5)
EOSINOPHIL NFR BLD AUTO: 2.3 % — SIGNIFICANT CHANGE UP (ref 0–6)
FOLATE SERPL-MCNC: >20 NG/ML — SIGNIFICANT CHANGE UP
GLUCOSE SERPL-MCNC: 93 MG/DL — SIGNIFICANT CHANGE UP (ref 70–99)
HBA1C BLD-MCNC: 6.2 % — HIGH (ref 4–5.6)
HCT VFR BLD CALC: 41.1 % — SIGNIFICANT CHANGE UP (ref 34.5–45)
HDLC SERPL-MCNC: 32 MG/DL — LOW
HGB BLD-MCNC: 12.4 G/DL — SIGNIFICANT CHANGE UP (ref 11.5–15.5)
LIPID PNL WITH DIRECT LDL SERPL: 75 MG/DL — SIGNIFICANT CHANGE UP
LYMPHOCYTES # BLD AUTO: 2 K/UL — SIGNIFICANT CHANGE UP (ref 1–3.3)
LYMPHOCYTES # BLD AUTO: 20.2 % — SIGNIFICANT CHANGE UP (ref 13–44)
MAGNESIUM SERPL-MCNC: 1.9 MG/DL — SIGNIFICANT CHANGE UP (ref 1.6–2.6)
MCHC RBC-ENTMCNC: 27.5 PG — SIGNIFICANT CHANGE UP (ref 27–34)
MCHC RBC-ENTMCNC: 30.2 GM/DL — LOW (ref 32–36)
MCV RBC AUTO: 91.1 FL — SIGNIFICANT CHANGE UP (ref 80–100)
MONOCYTES # BLD AUTO: 1 K/UL — HIGH (ref 0–0.9)
MONOCYTES NFR BLD AUTO: 10.6 % — SIGNIFICANT CHANGE UP (ref 2–14)
NEUTROPHILS # BLD AUTO: 6.4 K/UL — SIGNIFICANT CHANGE UP (ref 1.8–7.4)
NEUTROPHILS NFR BLD AUTO: 65.6 % — SIGNIFICANT CHANGE UP (ref 43–77)
PHOSPHATE SERPL-MCNC: 3.2 MG/DL — SIGNIFICANT CHANGE UP (ref 2.5–4.5)
PLATELET # BLD AUTO: 264 K/UL — SIGNIFICANT CHANGE UP (ref 150–400)
POTASSIUM SERPL-MCNC: 3.8 MMOL/L — SIGNIFICANT CHANGE UP (ref 3.5–5.3)
POTASSIUM SERPL-SCNC: 3.8 MMOL/L — SIGNIFICANT CHANGE UP (ref 3.5–5.3)
RBC # BLD: 4.51 M/UL — SIGNIFICANT CHANGE UP (ref 3.8–5.2)
RBC # FLD: 13.7 % — SIGNIFICANT CHANGE UP (ref 10.3–14.5)
SODIUM SERPL-SCNC: 139 MMOL/L — SIGNIFICANT CHANGE UP (ref 135–145)
TOTAL CHOLESTEROL/HDL RATIO MEASUREMENT: 4 RATIO — SIGNIFICANT CHANGE UP (ref 3.3–7.1)
TRIGL SERPL-MCNC: 101 MG/DL — SIGNIFICANT CHANGE UP (ref 10–149)
TSH SERPL-MCNC: 1.44 UU/ML — SIGNIFICANT CHANGE UP (ref 0.34–4.82)
VIT B12 SERPL-MCNC: 331 PG/ML — SIGNIFICANT CHANGE UP (ref 232–1245)
WBC # BLD: 9.7 K/UL — SIGNIFICANT CHANGE UP (ref 3.8–10.5)
WBC # FLD AUTO: 9.7 K/UL — SIGNIFICANT CHANGE UP (ref 3.8–10.5)

## 2018-09-23 RX ORDER — LACTULOSE 10 G/15ML
20 SOLUTION ORAL DAILY
Qty: 0 | Refills: 0 | Status: DISCONTINUED | OUTPATIENT
Start: 2018-09-23 | End: 2018-09-25

## 2018-09-23 RX ORDER — HALOPERIDOL DECANOATE 100 MG/ML
2 INJECTION INTRAMUSCULAR ONCE
Qty: 0 | Refills: 0 | Status: COMPLETED | OUTPATIENT
Start: 2018-09-23 | End: 2018-09-23

## 2018-09-23 RX ADMIN — ENOXAPARIN SODIUM 30 MILLIGRAM(S): 100 INJECTION SUBCUTANEOUS at 12:14

## 2018-09-23 RX ADMIN — ESCITALOPRAM OXALATE 10 MILLIGRAM(S): 10 TABLET, FILM COATED ORAL at 12:15

## 2018-09-23 RX ADMIN — Medication 350 MILLIGRAM(S): at 17:32

## 2018-09-23 RX ADMIN — Medication 81 MILLIGRAM(S): at 12:14

## 2018-09-23 RX ADMIN — LACTULOSE 20 GRAM(S): 10 SOLUTION ORAL at 12:35

## 2018-09-23 RX ADMIN — Medication 1000 UNIT(S): at 12:14

## 2018-09-23 RX ADMIN — MAGNESIUM HYDROXIDE 30 MILLILITER(S): 400 TABLET, CHEWABLE ORAL at 12:35

## 2018-09-23 RX ADMIN — Medication 1 TABLET(S): at 12:13

## 2018-09-23 RX ADMIN — Medication 350 MILLIGRAM(S): at 06:11

## 2018-09-23 RX ADMIN — ERTAPENEM SODIUM 120 MILLIGRAM(S): 1 INJECTION, POWDER, LYOPHILIZED, FOR SOLUTION INTRAMUSCULAR; INTRAVENOUS at 16:01

## 2018-09-23 RX ADMIN — HALOPERIDOL DECANOATE 2 MILLIGRAM(S): 100 INJECTION INTRAMUSCULAR at 15:36

## 2018-09-23 RX ADMIN — PREGABALIN 1000 MICROGRAM(S): 225 CAPSULE ORAL at 12:13

## 2018-09-23 NOTE — PROGRESS NOTE ADULT - SUBJECTIVE AND OBJECTIVE BOX
Patient is a 72y old  Female who presents with a chief complaint of Fever (23 Sep 2018 16:00)    PATIENT IS SEEN AND EXAMINED IN MEDICAL FLOOR.    ALLERGIES:  No Known Allergies    VITALS:    Vital Signs Last 24 Hrs  T(C): 36.9 (23 Sep 2018 14:36), Max: 36.9 (22 Sep 2018 20:45)  T(F): 98.5 (23 Sep 2018 14:36), Max: 98.5 (23 Sep 2018 14:36)  HR: 65 (23 Sep 2018 14:36) (65 - 84)  BP: 105/57 (23 Sep 2018 14:36) (105/57 - 148/81)  BP(mean): --  RR: 18 (23 Sep 2018 14:36) (18 - 18)  SpO2: 99% (23 Sep 2018 14:36) (97% - 99%)    LABS:  CBC Full  -  ( 23 Sep 2018 06:12 )  WBC Count : 9.7 K/uL  Hemoglobin : 12.4 g/dL  Hematocrit : 41.1 %  Platelet Count - Automated : 264 K/uL  Mean Cell Volume : 91.1 fl  Mean Cell Hemoglobin : 27.5 pg  Mean Cell Hemoglobin Concentration : 30.2 gm/dL  Auto Neutrophil # : 6.4 K/uL  Auto Lymphocyte # : 2.0 K/uL  Auto Monocyte # : 1.0 K/uL  Auto Eosinophil # : 0.2 K/uL  Auto Basophil # : 0.1 K/uL  Auto Neutrophil % : 65.6 %  Auto Lymphocyte % : 20.2 %  Auto Monocyte % : 10.6 %  Auto Eosinophil % : 2.3 %  Auto Basophil % : 1.2 %      09-23    139  |  106  |  20<H>  ----------------------------<  93  3.8   |  25  |  0.66    Ca    9.0      23 Sep 2018 06:12  Phos  3.2     09-23  Mg     1.9     09-23    TPro  7.3  /  Alb  3.3<L>  /  TBili  0.6  /  DBili  x   /  AST  18  /  ALT  22  /  AlkPhos  118  09-22      CARDIAC MARKERS ( 22 Sep 2018 10:46 )  <0.015 ng/mL / x     / x     / x     / x          LIVER FUNCTIONS - ( 22 Sep 2018 10:46 )  Alb: 3.3 g/dL / Pro: 7.3 g/dL / ALK PHOS: 118 U/L / ALT: 22 U/L DA / AST: 18 U/L / GGT: x             .Blood Blood-Peripheral  09-22 @ 17:03   No growth to date.  --  --      .Urine Clean Catch (Midstream)  09-22 @ 16:53   >100,000 CFU/ml Escherichia coli  --  --          MEDICATIONS:    MEDICATIONS  (STANDING):  aspirin  chewable 81 milliGRAM(s) Oral daily  cholecalciferol 1000 Unit(s) Oral daily  cyanocobalamin 1000 MICROGram(s) Oral daily  dextrose 5% + sodium chloride 0.9%. 1000 milliLiter(s) (90 mL/Hr) IV Continuous <Continuous>  enoxaparin Injectable 30 milliGRAM(s) SubCutaneous daily  ertapenem  IVPB      ertapenem  IVPB 1000 milliGRAM(s) IV Intermittent every 24 hours  escitalopram 10 milliGRAM(s) Oral daily  lactulose Syrup 20 Gram(s) Oral daily  magnesium hydroxide Suspension 30 milliLiter(s) Oral daily  multivitamin/minerals 1 Tablet(s) Oral daily  valproic  acid Syrup 350 milliGRAM(s) Oral two times a day      MEDICATIONS  (PRN):  acetaminophen   Tablet .. 650 milliGRAM(s) Oral every 6 hours PRN Temp greater or equal to 38C (100.4F), Mild Pain (1 - 3)      REVIEW OF SYSTEMS:                           ALL ROS DONE [ X   ]    CONSTITUTIONAL:  LETHARGIC [   ], FEVER [   ], UNRESPONSIVE [   ]  CVS:  CP  [   ], SOB, [   ], PALPITATIONS [   ], DIZZYNESS [   ]  RS: COUGH [   ], SPUTUM [   ]  GI: ABDOMINAL PAIN [   ], NAUSEA [   ], VOMITINGS [   ], DIARRHEA [   ], CONSTIPATION [   ]  :  DYSURIA [   ], NOCTURIA [   ], INCREASED FREQUENCY [   ], DRIBLING [   ],  SKELETAL: PAINFUL JOINTS [   ], SWOLLEN JOINTS [   ], NECK ACHE [   ], LOW BACK ACHE [   ],  SKIN : ULCERS [   ], RASH [   ], ITCHING [   ]  CNS: HEAD ACHE [   ], DOUBLE VISION [   ], BLURRED VISION [   ], AMS / CONFUSION [   ], SEIZURES [   ], WEAKNESS [   ],TINGLING / NUMBNESS [   ]    PHYSICAL EXAMINATION:  GENERAL APPEARANCE: NO DISTRESS  HEENT:  NO PALLOR, NO  JVD,  NO   NODES, NECK SUPPLE  CVS: S1 +, S2 +,   RS: AEEB,  OCCASIONAL  RALES +,   NO RONCHI  ABD: SOFT, NT, NO, BS +  EXT: NO PE  SKIN: WARM,   SKELETAL:  ROM ACCEPTABLE  CNS:  AAO X 1   , NO  DEFICITS    RADIOLOGY :    < from: CT Abdomen and Pelvis w/ Oral Cont and w/ IV Cont (09.22.18 @ 12:37) >          IMPRESSION:      < from: CT Abdomen and Pelvis w/ Oral Cont and w/ IV Cont (09.22.18 @ 12:37) >  Fecal material is scattered throughout the colon. There is no evidence   for mechanical bowel obstruction. No colonic wall thickening is   identified. There is noevidence for free intraperitoneal air or fluid.   The appendix is not well visualized. Note is made of a small hiatal   hernia.    < end of copied text >          Patient status post cholecystectomy. There is pancreatic   ductal dilatation. Hyperdense foci are identified in the region of the   pancreas head; differential considerations would include pancreatic   parenchymal calcifications related to chronic calcific pancreatitis   versus calcifications within the pancreatic duct or extrahepatic CBD.   Correlate clinically. Consider MRCP evaluation if the patient is able to   undergo that examination.  Focal ectasia of the infrarenal abdominal   aorta measures 3.0 cm.    < end of copied text >      ASSESSMENT :     Urinary tract infection  Gait abnormality  Carotid stenosis  Parkinson disease  Urinary incontinence  Depression  Dementia  Hypertension  History of cholecystectomy    PLAN:  HPI:  Patient is a 72F from [facility], AAOx1, Bed bound, with PMH dementia, , depression, Parkinson's, HTN, CVA, L carotid stenosis, UTI with ESBL, gait instability, Urinary incontinence presenting with 1 day fever. Patient is poor historian, History obatined from ED physican and NH nurse over phone. As per NH patient had episode of High grade fever 103 F and she was hypotensive. Patient received tylenol at NH and fever was resolved. Patient was discharged 6 months ago with sepsis 2/2 UTI with ESBL on IV Invanz. Patient sent to hospital concern for sepsis. They reported patient was feeling very weak, Tired and more lethargic. Patient is bedbound, AAox1 and has dementia, moves with assistance but know she feels more weak. Patient has poor appetite. Denies any cough, SOB, chest pain, nausea, vomiting, diarrhea, abdominal pain, muscle pain, joint swelling, skin rash or skin breakdown. Patient has urinary incontinence unable to tell if she has any urinary complaints. she complaining of pain but not able to localized.      NKDA   social Hx from NH, denies smoking, alcohol abuse or IVDA  surgical Hx cholecystectomy   family Hx no known family Hx (22 Sep 2018 14:32)    - UTI  (  ESBL E. COLI EARLY THIS YEAR ) - ON ERTAPENEM, F/UP UR[facility] CX[facility]  - FECAL IMPACTION - ON LACTULOSE, MOM  - SEVERE PROTEIN CALORIE MALNUTRITION  - GI AND DVT PROPHYLAXIS  - DR. MURRAY

## 2018-09-23 NOTE — CONSULT NOTE ADULT - GASTROINTESTINAL DETAILS
no guarding/no rigidity/no organomegaly/soft/no masses palpable/bowel sounds normal/nontender/no distention

## 2018-09-23 NOTE — CONSULT NOTE ADULT - SUBJECTIVE AND OBJECTIVE BOX
HPI:  Patient is a 72F from [facility], AAOx1, Bed bound, with PMH dementia, , depression, Parkinson's, HTN, CVA, L carotid stenosis, UTI with ESBL, gait instability, Urinary incontinence presenting with 1 day fever. Patient is poor historian, History obatined from ED physican and NH nurse over phone. As per NH patient had episode of High grade fever 103 F and she was hypotensive. Patient received tylenol at NH and fever was resolved. Patient was discharged 6 months ago with sepsis 2/2 UTI with ESBL on IV Invanz. Patient sent to hospital concern for sepsis. They reported patient was feeling very weak, Tired and more lethargic. Patient is bedbound, AAox1 and has dementia, moves with assistance but know she feels more weak. Patient has poor appetite. Denies any cough, SOB, chest pain, nausea, vomiting, diarrhea, abdominal pain, muscle pain, joint swelling, skin rash or skin breakdown. Patient has urinary incontinence unable to tell if she has any urinary complaints. she complaining of pain but not able to localized.      NKDA   social Hx from NH, denies smoking, alcohol abuse or IVDA  surgical Hx cholecystectomy   family Hx no known family Hx (22 Sep 2018 14:32)      PAST MEDICAL & SURGICAL HISTORY:  Gait abnormality  Carotid stenosis  Parkinson disease  Urinary incontinence  Depression  Dementia  Hypertension  History of cholecystectomy      No Known Allergies      Meds:  acetaminophen   Tablet .. 650 milliGRAM(s) Oral every 6 hours PRN  aspirin  chewable 81 milliGRAM(s) Oral daily  cholecalciferol 1000 Unit(s) Oral daily  cyanocobalamin 1000 MICROGram(s) Oral daily  dextrose 5% + sodium chloride 0.9%. 1000 milliLiter(s) IV Continuous <Continuous>  enoxaparin Injectable 30 milliGRAM(s) SubCutaneous daily  ertapenem  IVPB      ertapenem  IVPB 1000 milliGRAM(s) IV Intermittent every 24 hours  escitalopram 10 milliGRAM(s) Oral daily  lactulose Syrup 20 Gram(s) Oral daily  magnesium hydroxide Suspension 30 milliLiter(s) Oral daily  multivitamin/minerals 1 Tablet(s) Oral daily  valproic  acid Syrup 350 milliGRAM(s) Oral two times a day      SOCIAL HISTORY:  Smoker:  YES / NO        PACK YEARS:                         WHEN QUIT?  ETOH use:  YES / NO               FREQUENCY / QUANTITY:  Ilicit Drug use:  YES / NO  Occupation:  Assisted device use (Cane / Walker):  Live with:    FAMILY HISTORY:  No pertinent family history in first degree relatives      VITALS:  Vital Signs Last 24 Hrs  T(C): 36.9 (23 Sep 2018 14:36), Max: 37.8 (22 Sep 2018 19:25)  T(F): 98.5 (23 Sep 2018 14:36), Max: 100 (22 Sep 2018 19:25)  HR: 65 (23 Sep 2018 14:36) (65 - 87)  BP: 105/57 (23 Sep 2018 14:36) (105/57 - 158/88)  BP(mean): --  RR: 18 (23 Sep 2018 14:36) (18 - 18)  SpO2: 99% (23 Sep 2018 14:36) (96% - 99%)    LABS/DIAGNOSTIC TESTS:                          12.4   9.7   )-----------( 264      ( 23 Sep 2018 06:12 )             41.1     WBC Count: 9.7 K/uL ( @ 06:12)  WBC Count: 9.9 K/uL ( @ 10:46)          139  |  106  |  20<H>  ----------------------------<  93  3.8   |  25  |  0.66    Ca    9.0      23 Sep 2018 06:12  Phos  3.2       Mg     1.9         TPro  7.3  /  Alb  3.3<L>  /  TBili  0.6  /  DBili  x   /  AST  18  /  ALT  22  /  AlkPhos  118        Urinalysis Basic - ( 22 Sep 2018 10:46 )    Color: Yellow / Appearance: very cloudy / S.015 / pH: x  Gluc: x / Ketone: Negative  / Bili: Negative / Urobili: Negative   Blood: x / Protein: 30 mg/dL / Nitrite: Negative   Leuk Esterase: Moderate / RBC: 5-10 /HPF / WBC >50 /HPF   Sq Epi: x / Non Sq Epi: Few /HPF / Bacteria: Many /HPF        LIVER FUNCTIONS - ( 22 Sep 2018 10:46 )  Alb: 3.3 g/dL / Pro: 7.3 g/dL / ALK PHOS: 118 U/L / ALT: 22 U/L DA / AST: 18 U/L / GGT: x                 LACTATE:    ABG -     CULTURES:       RADIOLOGY:< from: CT Abdomen and Pelvis w/ Oral Cont and w/ IV Cont (18 @ 12:37) >  EXAM:  CT ABDOMEN AND PELVIS OC IC                            PROCEDURE DATE:  2018          INTERPRETATION:  CLINICAL HISTORY:  Abdominal and right flank pain, fever    Multiple axial images of the abdomen and pelvis were obtained from the   lung bases through pubic symphysis without the administration of oral   contrast. 90 cc of Omnipaque 350 was administered intravenously without   complication. Reformatted coronal and sagittal images are submitted.    COMPARISON: None    FINDINGS:    The liver is normal in size. Generalized decreased attenuation of the   hepatic parenchyma suggests hepatic parenchymal fatty infiltration. No   focal hepatic masses are identified. There is no evidence for   intrahepatic or extrahepatic biliary dilatation. The patient is status   post cholecystectomy.    The spleen and adrenal glands are unremarkable. There is pancreatic   ductal dilatation. Hyperdense foci are identified in the region of the   pancreas head; differential considerations would include pancreatic   parenchymal calcifications related to chronic calcific pancreatitis   versus calcifications within the pancreatic duct or extrahepatic CBD.   Correlate clinically. Consider MRCP evaluation if the patient is able to   undergo that examination. No discrete pancreatic masses are identified.    There is no evidence for bilateral hydronephrosis, renal calculi or   suspicious cystic/solid abnormalities of the renal parenchyma. Focal   ectasia of the infrarenal abdominal aorta measures3.0 cm. No abnormally   enlarged retroperitoneal or pelvic lymphadenopathy is noted.    Fecal material is scattered throughout the colon. There is no evidence   for mechanical bowel obstruction. No colonic wall thickening is   identified. There is noevidence for free intraperitoneal air or fluid.   The appendix is not well visualized. Note is made of a small hiatal   hernia.    The patient is status post right hip arthroplasty resulting impingement   hardening artifact within the pelvis; no gross abnormalities of the   uterus or bladder demonstrated.    Imaging of the lung bases demonstrates nodular thickening along the   medial aspect of the right hemidiaphragm pleural surface. Degenerative   changes of the thoracic and lumbar spine noted.    IMPRESSION:  Patient status post cholecystectomy. There is pancreatic   ductal dilatation. Hyperdense foci are identified in the region of the   pancreas head; differential considerations would include pancreatic   parenchymal calcifications related to chronic calcific pancreatitis   versus calcifications within the pancreatic duct or extrahepatic CBD.   Correlate clinically. Consider MRCP evaluation if the patient is able to   undergo that examination.  Focal ectasia of the infrarenal abdominal   aorta measures 3.0 cm.    ------------------------------------------------------------------------------------------------------------------------------------------------------------------------      EXAM:  XR CHEST AP OR PA 1V                            PROCEDURE DATE:  2018          INTERPRETATION:  Portable chest radiograph       CLINICAL INFORMATION: Fever, lethargy. The patient is unable to   communicate.     TECHNIQUE:  Single portable frontal AP view of the chest was obtained.    FINDINGS: The examination of 2018 is available for review.    The lungs are free of infiltrate.  No pleural abnormality is seen.      The heart and mediastinum appear intact. The visualized skeleton appears   unremarkable.      IMPRESSION:   No infiltrate.        < end of copied text >      ROS  [  ] UNABLE TO ELICIT HPI:  Patient is a 72F from [facility], AAOx1, Bed bound, with PMH dementia, , depression, Parkinson's, HTN, CVA, L carotid stenosis, UTI with ESBL, gait instability, Urinary incontinence presenting with 1 day fever. Patient is poor historian, History obatined from ED physican and NH nurse over phone. As per NH patient had episode of High grade fever 103 F and she was hypotensive. Patient received tylenol at NH and fever was resolved. Patient was discharged 6 months ago with sepsis 2/2 UTI with ESBL on IV Invanz. Patient sent to hospital concern for sepsis. They reported patient was feeling very weak, Tired and more lethargic. Patient is bedbound, AAOx1-2 and has dementia, moves with assistance but know she feels more weak. Patient  Denies any cough, SOB, chest pain, nausea, vomiting, diarrhea, abdominal pain,she does state that she has pain when she urinates but is an unreliable historian.    NKDA   social Hx from NH, denies smoking, alcohol abuse ( she states that she did smoke and drink years ago)  surgical Hx cholecystectomy   family Hx no known family Hx (22 Sep 2018 14:32)      PAST MEDICAL & SURGICAL HISTORY:  Gait abnormality  Carotid stenosis  Parkinson disease  Urinary incontinence  Depression  Dementia  Hypertension  History of cholecystectomy      No Known Allergies      Meds:  acetaminophen   Tablet .. 650 milliGRAM(s) Oral every 6 hours PRN  aspirin  chewable 81 milliGRAM(s) Oral daily  cholecalciferol 1000 Unit(s) Oral daily  cyanocobalamin 1000 MICROGram(s) Oral daily  dextrose 5% + sodium chloride 0.9%. 1000 milliLiter(s) IV Continuous <Continuous>  enoxaparin Injectable 30 milliGRAM(s) SubCutaneous daily  ertapenem  IVPB      ertapenem  IVPB 1000 milliGRAM(s) IV Intermittent every 24 hours  escitalopram 10 milliGRAM(s) Oral daily  lactulose Syrup 20 Gram(s) Oral daily  magnesium hydroxide Suspension 30 milliLiter(s) Oral daily  multivitamin/minerals 1 Tablet(s) Oral daily  valproic  acid Syrup 350 milliGRAM(s) Oral two times a day        FAMILY HISTORY:  No pertinent family history in first degree relatives      VITALS:  Vital Signs Last 24 Hrs  T(C): 36.9 (23 Sep 2018 14:36), Max: 37.8 (22 Sep 2018 19:25)  T(F): 98.5 (23 Sep 2018 14:36), Max: 100 (22 Sep 2018 19:25)  HR: 65 (23 Sep 2018 14:36) (65 - 87)  BP: 105/57 (23 Sep 2018 14:36) (105/57 - 158/88)  BP(mean): --  RR: 18 (23 Sep 2018 14:36) (18 - 18)  SpO2: 99% (23 Sep 2018 14:36) (96% - 99%)    LABS/DIAGNOSTIC TESTS:                          12.4   9.7   )-----------( 264      ( 23 Sep 2018 06:12 )             41.1     WBC Count: 9.7 K/uL ( @ 06:12)  WBC Count: 9.9 K/uL ( @ 10:46)          139  |  106  |  20<H>  ----------------------------<  93  3.8   |  25  |  0.66    Ca    9.0      23 Sep 2018 06:12  Phos  3.2       Mg     1.9         TPro  7.3  /  Alb  3.3<L>  /  TBili  0.6  /  DBili  x   /  AST  18  /  ALT  22  /  AlkPhos  118        Urinalysis Basic - ( 22 Sep 2018 10:46 )    Color: Yellow / Appearance: very cloudy / S.015 / pH: x  Gluc: x / Ketone: Negative  / Bili: Negative / Urobili: Negative   Blood: x / Protein: 30 mg/dL / Nitrite: Negative   Leuk Esterase: Moderate / RBC: 5-10 /HPF / WBC >50 /HPF   Sq Epi: x / Non Sq Epi: Few /HPF / Bacteria: Many /HPF        LIVER FUNCTIONS - ( 22 Sep 2018 10:46 )  Alb: 3.3 g/dL / Pro: 7.3 g/dL / ALK PHOS: 118 U/L / ALT: 22 U/L DA / AST: 18 U/L / GGT: x                 LACTATE:    ABG -     CULTURES:       RADIOLOGY:< from: CT Abdomen and Pelvis w/ Oral Cont and w/ IV Cont (18 @ 12:37) >  EXAM:  CT ABDOMEN AND PELVIS OC IC                            PROCEDURE DATE:  2018          INTERPRETATION:  CLINICAL HISTORY:  Abdominal and right flank pain, fever    Multiple axial images of the abdomen and pelvis were obtained from the   lung bases through pubic symphysis without the administration of oral   contrast. 90 cc of Omnipaque 350 was administered intravenously without   complication. Reformatted coronal and sagittal images are submitted.    COMPARISON: None    FINDINGS:    The liver is normal in size. Generalized decreased attenuation of the   hepatic parenchyma suggests hepatic parenchymal fatty infiltration. No   focal hepatic masses are identified. There is no evidence for   intrahepatic or extrahepatic biliary dilatation. The patient is status   post cholecystectomy.    The spleen and adrenal glands are unremarkable. There is pancreatic   ductal dilatation. Hyperdense foci are identified in the region of the   pancreas head; differential considerations would include pancreatic   parenchymal calcifications related to chronic calcific pancreatitis   versus calcifications within the pancreatic duct or extrahepatic CBD.   Correlate clinically. Consider MRCP evaluation if the patient is able to   undergo that examination. No discrete pancreatic masses are identified.    There is no evidence for bilateral hydronephrosis, renal calculi or   suspicious cystic/solid abnormalities of the renal parenchyma. Focal   ectasia of the infrarenal abdominal aorta measures3.0 cm. No abnormally   enlarged retroperitoneal or pelvic lymphadenopathy is noted.    Fecal material is scattered throughout the colon. There is no evidence   for mechanical bowel obstruction. No colonic wall thickening is   identified. There is noevidence for free intraperitoneal air or fluid.   The appendix is not well visualized. Note is made of a small hiatal   hernia.    The patient is status post right hip arthroplasty resulting impingement   hardening artifact within the pelvis; no gross abnormalities of the   uterus or bladder demonstrated.    Imaging of the lung bases demonstrates nodular thickening along the   medial aspect of the right hemidiaphragm pleural surface. Degenerative   changes of the thoracic and lumbar spine noted.    IMPRESSION:  Patient status post cholecystectomy. There is pancreatic   ductal dilatation. Hyperdense foci are identified in the region of the   pancreas head; differential considerations would include pancreatic   parenchymal calcifications related to chronic calcific pancreatitis   versus calcifications within the pancreatic duct or extrahepatic CBD.   Correlate clinically. Consider MRCP evaluation if the patient is able to   undergo that examination.  Focal ectasia of the infrarenal abdominal   aorta measures 3.0 cm.    ------------------------------------------------------------------------------------------------------------------------------------------------------------------------      EXAM:  XR CHEST AP OR PA 1V                            PROCEDURE DATE:  2018          INTERPRETATION:  Portable chest radiograph       CLINICAL INFORMATION: Fever, lethargy. The patient is unable to   communicate.     TECHNIQUE:  Single portable frontal AP view of the chest was obtained.    FINDINGS: The examination of 2018 is available for review.    The lungs are free of infiltrate.  No pleural abnormality is seen.      The heart and mediastinum appear intact. The visualized skeleton appears   unremarkable.      IMPRESSION:   No infiltrate.        < end of copied text >      ROS - as above in HPI but is unreliable

## 2018-09-24 DIAGNOSIS — Z29.9 ENCOUNTER FOR PROPHYLACTIC MEASURES, UNSPECIFIED: ICD-10-CM

## 2018-09-24 LAB
-  AMIKACIN: SIGNIFICANT CHANGE UP
-  AMIKACIN: SIGNIFICANT CHANGE UP
-  AMOXICILLIN/CLAVULANIC ACID: SIGNIFICANT CHANGE UP
-  AMOXICILLIN/CLAVULANIC ACID: SIGNIFICANT CHANGE UP
-  AMPICILLIN/SULBACTAM: SIGNIFICANT CHANGE UP
-  AMPICILLIN/SULBACTAM: SIGNIFICANT CHANGE UP
-  AMPICILLIN: SIGNIFICANT CHANGE UP
-  AMPICILLIN: SIGNIFICANT CHANGE UP
-  AZTREONAM: SIGNIFICANT CHANGE UP
-  AZTREONAM: SIGNIFICANT CHANGE UP
-  CEFAZOLIN: SIGNIFICANT CHANGE UP
-  CEFAZOLIN: SIGNIFICANT CHANGE UP
-  CEFEPIME: SIGNIFICANT CHANGE UP
-  CEFEPIME: SIGNIFICANT CHANGE UP
-  CEFOXITIN: SIGNIFICANT CHANGE UP
-  CEFOXITIN: SIGNIFICANT CHANGE UP
-  CEFTRIAXONE: SIGNIFICANT CHANGE UP
-  CEFTRIAXONE: SIGNIFICANT CHANGE UP
-  CIPROFLOXACIN: SIGNIFICANT CHANGE UP
-  CIPROFLOXACIN: SIGNIFICANT CHANGE UP
-  ERTAPENEM: SIGNIFICANT CHANGE UP
-  ERTAPENEM: SIGNIFICANT CHANGE UP
-  GENTAMICIN: SIGNIFICANT CHANGE UP
-  GENTAMICIN: SIGNIFICANT CHANGE UP
-  IMIPENEM: SIGNIFICANT CHANGE UP
-  IMIPENEM: SIGNIFICANT CHANGE UP
-  LEVOFLOXACIN: SIGNIFICANT CHANGE UP
-  LEVOFLOXACIN: SIGNIFICANT CHANGE UP
-  MEROPENEM: SIGNIFICANT CHANGE UP
-  MEROPENEM: SIGNIFICANT CHANGE UP
-  NITROFURANTOIN: SIGNIFICANT CHANGE UP
-  NITROFURANTOIN: SIGNIFICANT CHANGE UP
-  PIPERACILLIN/TAZOBACTAM: SIGNIFICANT CHANGE UP
-  PIPERACILLIN/TAZOBACTAM: SIGNIFICANT CHANGE UP
-  TIGECYCLINE: SIGNIFICANT CHANGE UP
-  TIGECYCLINE: SIGNIFICANT CHANGE UP
-  TOBRAMYCIN: SIGNIFICANT CHANGE UP
-  TOBRAMYCIN: SIGNIFICANT CHANGE UP
-  TRIMETHOPRIM/SULFAMETHOXAZOLE: SIGNIFICANT CHANGE UP
-  TRIMETHOPRIM/SULFAMETHOXAZOLE: SIGNIFICANT CHANGE UP
24R-OH-CALCIDIOL SERPL-MCNC: 14.3 NG/ML — LOW (ref 30–80)
CULTURE RESULTS: SIGNIFICANT CHANGE UP
METHOD TYPE: SIGNIFICANT CHANGE UP
METHOD TYPE: SIGNIFICANT CHANGE UP
ORGANISM # SPEC MICROSCOPIC CNT: SIGNIFICANT CHANGE UP
SPECIMEN SOURCE: SIGNIFICANT CHANGE UP

## 2018-09-24 RX ORDER — ERGOCALCIFEROL 1.25 MG/1
50000 CAPSULE ORAL
Qty: 0 | Refills: 0 | Status: DISCONTINUED | OUTPATIENT
Start: 2018-09-24 | End: 2018-09-25

## 2018-09-24 RX ORDER — AMLODIPINE BESYLATE 2.5 MG/1
2.5 TABLET ORAL ONCE
Qty: 0 | Refills: 0 | Status: COMPLETED | OUTPATIENT
Start: 2018-09-24 | End: 2018-09-24

## 2018-09-24 RX ADMIN — ERGOCALCIFEROL 50000 UNIT(S): 1.25 CAPSULE ORAL at 11:17

## 2018-09-24 RX ADMIN — ENOXAPARIN SODIUM 30 MILLIGRAM(S): 100 INJECTION SUBCUTANEOUS at 11:17

## 2018-09-24 RX ADMIN — AMLODIPINE BESYLATE 2.5 MILLIGRAM(S): 2.5 TABLET ORAL at 23:12

## 2018-09-24 RX ADMIN — ERTAPENEM SODIUM 120 MILLIGRAM(S): 1 INJECTION, POWDER, LYOPHILIZED, FOR SOLUTION INTRAMUSCULAR; INTRAVENOUS at 15:05

## 2018-09-24 RX ADMIN — Medication 350 MILLIGRAM(S): at 06:09

## 2018-09-24 RX ADMIN — PREGABALIN 1000 MICROGRAM(S): 225 CAPSULE ORAL at 11:17

## 2018-09-24 RX ADMIN — ESCITALOPRAM OXALATE 10 MILLIGRAM(S): 10 TABLET, FILM COATED ORAL at 11:17

## 2018-09-24 RX ADMIN — Medication 350 MILLIGRAM(S): at 17:11

## 2018-09-24 RX ADMIN — Medication 1 DROP(S): at 23:12

## 2018-09-24 RX ADMIN — AMLODIPINE BESYLATE 2.5 MILLIGRAM(S): 2.5 TABLET ORAL at 06:09

## 2018-09-24 RX ADMIN — Medication 1 DROP(S): at 18:31

## 2018-09-24 RX ADMIN — Medication 81 MILLIGRAM(S): at 11:17

## 2018-09-24 RX ADMIN — Medication 1 TABLET(S): at 11:16

## 2018-09-24 NOTE — CHART NOTE - NSCHARTNOTEFT_GEN_A_CORE
Pt. had elevated blood pressure of 159/73. Amlodipine 2.5 mg oral has been given for now. Primary team please adjust blood pressure medication.

## 2018-09-24 NOTE — PROGRESS NOTE ADULT - PROBLEM SELECTOR PLAN 3
IMPROVE VTE Individual Risk Assessment    RISK                                                          Points  [] Previous VTE                                           3  [] Thrombophilia                                        2  [] Lower limb paralysis                              2   [] Current Cancer                                       2   [x] Immobilization > 24 hrs                        1  [] ICU/CCU stay > 24 hours                       1  [x] Age > 60                                                   1    IMPROVE VTE Score: 2  On Lovenox

## 2018-09-24 NOTE — CHART NOTE - NSCHARTNOTEFT_GEN_A_CORE
Pt had elevated blood pressure of 165/80. We have given amlodipine 2.5 mg oral for now. Primary team please adjust the medication.

## 2018-09-24 NOTE — PROGRESS NOTE ADULT - SUBJECTIVE AND OBJECTIVE BOX
Patient is a 72y old  Female who presents with a chief complaint of Fever. Ecoli UTI on abx.   S/p BM this AM on lactulose.       INTERVAL HPI/OVERNIGHT EVENTS:  Patient seen and examined at bedside. Denies chest pain, palpitation, SOB, nausea, vomiting, abdominal pain.    T(C): 36.7 (18 @ 05:33), Max: 36.9 (18 @ 14:36)  HR: 64 (18 @ 05:33) (64 - 72)  BP: 159/73 (18 @ 05:33) (105/57 - 159/73)  RR: 16 (18 @ 05:33) (16 - 18)  SpO2: 96% (18 @ 05:33) (96% - 100%)  Wt(kg): --  I&O's Summary        REVIEW OF SYSTEMS:  No fever,   No cough, SOB  No chest pain, palpitations  No Abd pain, nausea, vomiting, No diarrhea or constipation      PHYSICAL EXAM:    EYES: EOMI, PERRLA,  NECK: Supple, No JVD, Normal thyroid  Resp: CTAB, No crackles, wheezing,   CVS: Regular rate and rhythm; No murmurs, rubs, or gallops  ABD: Soft, Nontender, Nondistended; Bowel sounds present  EXTREMITIES:  2+ Peripheral Pulses, No edema    LABS:                        12.4   9.7   )-----------( 264      ( 23 Sep 2018 06:12 )             41.1     -    139  |  106  |  20<H>  ----------------------------<  93  3.8   |  25  |  0.66    Ca    9.0      23 Sep 2018 06:12  Phos  3.2       Mg     1.9         TPro  7.3  /  Alb  3.3<L>  /  TBili  0.6  /  DBili  x   /  AST  18  /  ALT  22  /  AlkPhos  118  -      CAPILLARY BLOOD GLUCOSE            Urinalysis Basic - ( 22 Sep 2018 10:46 )    Color: Yellow / Appearance: very cloudy / S.015 / pH: x  Gluc: x / Ketone: Negative  / Bili: Negative / Urobili: Negative   Blood: x / Protein: 30 mg/dL / Nitrite: Negative   Leuk Esterase: Moderate / RBC: 5-10 /HPF / WBC >50 /HPF   Sq Epi: x / Non Sq Epi: Few /HPF / Bacteria: Many /HPF      MEDICATIONS  (STANDING):  aspirin  chewable 81 milliGRAM(s) Oral daily  cyanocobalamin 1000 MICROGram(s) Oral daily  dextrose 5% + sodium chloride 0.9%. 1000 milliLiter(s) (90 mL/Hr) IV Continuous <Continuous>  enoxaparin Injectable 30 milliGRAM(s) SubCutaneous daily  ergocalciferol 14020 Unit(s) Oral <User Schedule>  ertapenem  IVPB      ertapenem  IVPB 1000 milliGRAM(s) IV Intermittent every 24 hours  escitalopram 10 milliGRAM(s) Oral daily  lactulose Syrup 20 Gram(s) Oral daily  magnesium hydroxide Suspension 30 milliLiter(s) Oral daily  multivitamin/minerals 1 Tablet(s) Oral daily  valproic  acid Syrup 350 milliGRAM(s) Oral two times a day    MEDICATIONS  (PRN):  acetaminophen   Tablet .. 650 milliGRAM(s) Oral every 6 hours PRN Temp greater or equal to 38C (100.4F), Mild Pain (1 - 3)      Radiology:

## 2018-09-24 NOTE — PROGRESS NOTE ADULT - ASSESSMENT
72F from [facility], AAOx1, Bed bound, with PMH dementia, , depression, Parkinson's, HTN, CVA, L carotid stenosis, UTI with ESBL, gait instability, Urinary incontinence presenting with 1 day fever.

## 2018-09-24 NOTE — ADVANCED PRACTICE NURSE CONSULT - ASSESSMENT
This is a 72yr old female patient admitted for UTI, presenting with a Stage 1 Pressure Injury to the Bilateral Heels and Sacrococcyx Area; as evident by non-blanchable erythema

## 2018-09-24 NOTE — PROGRESS NOTE ADULT - PROBLEM SELECTOR PLAN 1
On Ertapenem day 3 awaiting sensitivity and previous ESBL history this year  DC plan after sensitivity On Ertapenem day 3 awaiting sensitivity and previous ESBL history this year  DC plan after sensitivity, bedbound patient DC to [place]

## 2018-09-25 ENCOUNTER — TRANSCRIPTION ENCOUNTER (OUTPATIENT)
Age: 73
End: 2018-09-25

## 2018-09-25 VITALS
SYSTOLIC BLOOD PRESSURE: 128 MMHG | RESPIRATION RATE: 17 BRPM | TEMPERATURE: 99 F | OXYGEN SATURATION: 98 % | HEART RATE: 66 BPM | DIASTOLIC BLOOD PRESSURE: 68 MMHG

## 2018-09-25 PROCEDURE — 83735 ASSAY OF MAGNESIUM: CPT

## 2018-09-25 PROCEDURE — 84100 ASSAY OF PHOSPHORUS: CPT

## 2018-09-25 PROCEDURE — 82746 ASSAY OF FOLIC ACID SERUM: CPT

## 2018-09-25 PROCEDURE — 87086 URINE CULTURE/COLONY COUNT: CPT

## 2018-09-25 PROCEDURE — 82962 GLUCOSE BLOOD TEST: CPT

## 2018-09-25 PROCEDURE — 93005 ELECTROCARDIOGRAM TRACING: CPT

## 2018-09-25 PROCEDURE — 82306 VITAMIN D 25 HYDROXY: CPT

## 2018-09-25 PROCEDURE — 85027 COMPLETE CBC AUTOMATED: CPT

## 2018-09-25 PROCEDURE — 87486 CHLMYD PNEUM DNA AMP PROBE: CPT

## 2018-09-25 PROCEDURE — 87798 DETECT AGENT NOS DNA AMP: CPT

## 2018-09-25 PROCEDURE — 84484 ASSAY OF TROPONIN QUANT: CPT

## 2018-09-25 PROCEDURE — 84443 ASSAY THYROID STIM HORMONE: CPT

## 2018-09-25 PROCEDURE — 80061 LIPID PANEL: CPT

## 2018-09-25 PROCEDURE — 82607 VITAMIN B-12: CPT

## 2018-09-25 PROCEDURE — 83036 HEMOGLOBIN GLYCOSYLATED A1C: CPT

## 2018-09-25 PROCEDURE — 83690 ASSAY OF LIPASE: CPT

## 2018-09-25 PROCEDURE — 87186 SC STD MICRODIL/AGAR DIL: CPT

## 2018-09-25 PROCEDURE — 71045 X-RAY EXAM CHEST 1 VIEW: CPT

## 2018-09-25 PROCEDURE — 80048 BASIC METABOLIC PNL TOTAL CA: CPT

## 2018-09-25 PROCEDURE — 80053 COMPREHEN METABOLIC PANEL: CPT

## 2018-09-25 PROCEDURE — 81001 URINALYSIS AUTO W/SCOPE: CPT

## 2018-09-25 PROCEDURE — 99285 EMERGENCY DEPT VISIT HI MDM: CPT | Mod: 25

## 2018-09-25 PROCEDURE — 87633 RESP VIRUS 12-25 TARGETS: CPT

## 2018-09-25 PROCEDURE — 83605 ASSAY OF LACTIC ACID: CPT

## 2018-09-25 PROCEDURE — 74177 CT ABD & PELVIS W/CONTRAST: CPT

## 2018-09-25 PROCEDURE — 87581 M.PNEUMON DNA AMP PROBE: CPT

## 2018-09-25 PROCEDURE — 87040 BLOOD CULTURE FOR BACTERIA: CPT

## 2018-09-25 RX ORDER — PREGABALIN 225 MG/1
1 CAPSULE ORAL
Qty: 0 | Refills: 0 | COMMUNITY
Start: 2018-09-25

## 2018-09-25 RX ORDER — VALPROIC ACID (AS SODIUM SALT) 250 MG/5ML
7 SOLUTION, ORAL ORAL
Qty: 0 | Refills: 0 | COMMUNITY
Start: 2018-09-25

## 2018-09-25 RX ORDER — LACTULOSE 10 G/15ML
30 SOLUTION ORAL
Qty: 0 | Refills: 0 | COMMUNITY
Start: 2018-09-25

## 2018-09-25 RX ADMIN — Medication 81 MILLIGRAM(S): at 12:17

## 2018-09-25 RX ADMIN — LACTULOSE 20 GRAM(S): 10 SOLUTION ORAL at 12:17

## 2018-09-25 RX ADMIN — PREGABALIN 1000 MICROGRAM(S): 225 CAPSULE ORAL at 12:17

## 2018-09-25 RX ADMIN — ESCITALOPRAM OXALATE 10 MILLIGRAM(S): 10 TABLET, FILM COATED ORAL at 12:17

## 2018-09-25 RX ADMIN — Medication 1 TABLET(S): at 12:17

## 2018-09-25 RX ADMIN — Medication 1 DROP(S): at 12:18

## 2018-09-25 RX ADMIN — Medication 350 MILLIGRAM(S): at 17:14

## 2018-09-25 RX ADMIN — Medication 350 MILLIGRAM(S): at 06:16

## 2018-09-25 RX ADMIN — ENOXAPARIN SODIUM 30 MILLIGRAM(S): 100 INJECTION SUBCUTANEOUS at 12:17

## 2018-09-25 RX ADMIN — Medication 1 DROP(S): at 06:15

## 2018-09-25 NOTE — DIETITIAN INITIAL EVALUATION ADULT. - PROBLEM SELECTOR PLAN 1
patient came with fever, weakness, tiredness, Lethargy   Afebrile, HD stable , no leucocytosis, normal renal functions, normal lactate   UA positive for WBC >50 and esterase positive   CT Ab neg for acute pathology   CXR no consolidation or Infiltrate   f/u urine and Blood cultures   f/u RVP   Given h/o ESBL in urine   s/p 1 l NS bolus and Iv cefoxitin   will start on Invanz 1 gram Q 24 hrs  ID consult Dr miller  Gentle Hydration

## 2018-09-25 NOTE — PROGRESS NOTE ADULT - SUBJECTIVE AND OBJECTIVE BOX
Patient is a 72y old  Female who presents with a chief complaint of Fever (25 Sep 2018 10:57)      INTERVAL HPI/OVERNIGHT EVENTS:  Patient seen and examined at bedside. Denies chest pain, palpitation, SOB, nausea, vomiting, abdominal pain.    T(C): 36.7 (09-25-18 @ 05:00), Max: 36.8 (09-24-18 @ 20:22)  HR: 60 (09-25-18 @ 05:00) (60 - 80)  BP: 152/79 (09-25-18 @ 05:00) (141/73 - 165/80)  RR: 16 (09-25-18 @ 05:00) (16 - 18)  SpO2: 97% (09-25-18 @ 05:00) (97% - 99%)  Wt(kg): --  I&O's Summary        REVIEW OF SYSTEMS:  No fever,   No cough, SOB  No chest pain, palpitations  No Abd pain, nausea, vomiting, No diarrhea or constipation      PHYSICAL EXAM:    Neuro: AAO x 0  EYES: EOMI, PERRLA,  NECK: Supple, No JVD, Normal thyroid  Resp: CTAB, No crackles, wheezing,   CVS: Regular rate and rhythm; No murmurs, rubs, or gallops  ABD: Soft, Nontender, Nondistended; Bowel sounds present  EXTREMITIES:  2+ Peripheral Pulses, No edema    LABS:            CAPILLARY BLOOD GLUCOSE              MEDICATIONS  (STANDING):  artificial  tears Solution 1 Drop(s) Both EYES four times a day  aspirin  chewable 81 milliGRAM(s) Oral daily  cyanocobalamin 1000 MICROGram(s) Oral daily  dextrose 5% + sodium chloride 0.9%. 1000 milliLiter(s) (90 mL/Hr) IV Continuous <Continuous>  enoxaparin Injectable 30 milliGRAM(s) SubCutaneous daily  ergocalciferol 20846 Unit(s) Oral <User Schedule>  ertapenem  IVPB      ertapenem  IVPB 1000 milliGRAM(s) IV Intermittent every 24 hours  escitalopram 10 milliGRAM(s) Oral daily  lactulose Syrup 20 Gram(s) Oral daily  magnesium hydroxide Suspension 30 milliLiter(s) Oral daily  multivitamin/minerals 1 Tablet(s) Oral daily  valproic  acid Syrup 350 milliGRAM(s) Oral two times a day    MEDICATIONS  (PRN):  acetaminophen   Tablet .. 650 milliGRAM(s) Oral every 6 hours PRN Temp greater or equal to 38C (100.4F), Mild Pain (1 - 3)      Radiology:

## 2018-09-25 NOTE — DIETITIAN INITIAL EVALUATION ADULT. - OTHER INFO
Patient seen for poor appetite/unintentional wt. loss. Patient from [facility]. Extended Care & d/c from [facility] in April, 2018. Visited pt. alert but lethargic, per PCA pt. not eating so well, consuming 40-50% of meals, conducted nutrition focus physical exam found signs of malnutrition, see above note. At last admission on 4/18/18  pt. wt. 119 Lbs & current wt. 98 Lbs with 17% wt. loss x 4 months Patient seen for poor appetite/unintentional wt. loss. Patient from [facility]. Extended Care & d/c from [facility] in April, 2018. Visited pt. alert but lethargic, per PCA pt. not eating so well, consuming 40-50% of meals, conducted nutrition focus physical exam found signs of malnutrition, see above note. At last admission on 4/18/18  pt. wt. 119 Lbs & current wt. 98 Lbs with 17% wt. loss x 4 months since last admission, encourage po intake with feeding assistance, pressure ulce (sacrum-stage I) noted. Discussed with MD/RN.

## 2018-09-25 NOTE — CHART NOTE - NSCHARTNOTEFT_GEN_A_CORE
Upon Nutritional Assessment by the Registered Dietitian your patient was determined to meet criteria / has evidence of the following diagnosis/diagnoses:          [ ]  Mild Protein Calorie Malnutrition        [ ]  Moderate Protein Calorie Malnutrition        [X ] Severe Protein Calorie Malnutrition        [ ] Unspecified Protein Calorie Malnutrition        [X ] Underweight / BMI <19        [ ] Morbid Obesity / BMI > 40      Findings as based on:  •  Comprehensive nutrition assessment and consultation  •  Calorie counts (nutrient intake analysis)  •  Food acceptance and intake status from observations by staff  •  Follow up  •  Patient education  •  Intervention secondary to interdisciplinary rounds  •   concerns      Treatment:    The following diet has been recommended: Least restrictive diet due to advance age & chronic illness.      PROVIDER Section:     By signing this assessment you are acknowledging and agree with the diagnosis/diagnoses assigned by the Registered Dietitian    Comments:  Add Ensure Enlive 1 can TID, providing 1050 Kcal, Protein 60 grams & MVI/minerals daily as medically feasible.

## 2018-09-25 NOTE — DISCHARGE NOTE ADULT - MEDICATION SUMMARY - MEDICATIONS TO STOP TAKING
I will STOP taking the medications listed below when I get home from the hospital:    acetaminophen 325 mg oral tablet  -- 2 tab(s) by mouth every 6 hours, As needed, For Temp greater than 38 C (100.4 F)    nystatin 100,000 units/g topical cream  -- 1 application on skin 2 times a day

## 2018-09-25 NOTE — DIETITIAN INITIAL EVALUATION ADULT. - PHYSICAL APPEARANCE
"Chief Complaints and History of Present Illnesses   Patient presents with     Follow Up For     2 month f/u Papilledema associated with increased intracranial      Medication Problem     HPI    Affected eye(s):  Both   Symptoms:     Decreased vision   No double vision   No floaters   No flashes         Do you have eye pain now?:  No      Comments:  Patient notes VA in RE, \"vision would go out, one area of RE would be 'blind' for about 5-10 seconds\" notes the diamox \"worked fine\" but had very bad rashes, has d/c for about 2 weeks now, \"VA in RE still goes out intermittently still , but almost completely covers the RE still lasts about 10 secs and it goes away\" patient notes this occurs about 3-6 times in an hour.    Patient notes a \"tightness\" around BE or pressure feeling.     Hoa Ardon July 19, 2017 8:49 AM               " BMI 17, Nutrition focused physical exam conducted:  Subcutaneous fat loss: [Severe] Orbital fat pads region, [Severe], Buccal fat region, [Severe], Triceps region,  [ ]Ribs region.  Muscle wasting: [severe], Temples region,[ Severe]Clavicle region, [Severe] Shoulder region, [Severe] Scapula region, [ ]Interosseous region,  [ ]thigh region, [Severe]Calf region/emaciated/other (specify)/debilitated other (specify)/debilitated/BMI 16, Nutrition focused physical exam conducted:  Subcutaneous fat loss: [Severe] Orbital fat pads region, [Severe], Buccal fat region, [Severe], Triceps region,  [ ]Ribs region.  Muscle wasting: [severe], Temples region,[ Severe]Clavicle region, [Severe] Shoulder region, [Severe] Scapula region, [ ]Interosseous region,  [ ]thigh region, [Severe]Calf region/emaciated

## 2018-09-25 NOTE — DIETITIAN INITIAL EVALUATION ADULT. - NUTRITION INTERVENTION
Meals and Snack/Medical Food Supplements/Feeding Assistance/Vitamin/Collaboration and Referral of Nutrition Care

## 2018-09-25 NOTE — DISCHARGE NOTE ADULT - HOSPITAL COURSE
Patient is a 72F from [facility], AAOx1, Bed bound, with PMH dementia, , depression, Parkinson's, HTN, CVA, L carotid stenosis, UTI with ESBL, gait instability, Urinary incontinence presenting with 1 day fever. Patient is poor historian, History obatined from ED physican and NH nurse over phone. As per NH patient had episode of High grade fever 103 F and she was hypotensive. Patient received tylenol at NH and fever was resolved. Patient was discharged 6 months ago with sepsis 2/2 UTI with ESBL on IV Invanz. Patient sent to hospital concern for sepsis. They reported patient was feeling very weak, Tired and more lethargic. Patient is bedbound, AAox1 and has dementia, moves with assistance but know she feels more weak. Patient has poor appetite. Denies any cough, SOB, chest pain, nausea, vomiting, diarrhea, abdominal pain, muscle pain, joint swelling, skin rash or skin breakdown. Patient has urinary incontinence unable to tell if she has any urinary complaints. she complaining of pain but not able to localized.      NKDA   social Hx from NH, denies smoking, alcohol abuse or IVDA  surgical Hx cholecystectomy   family Hx no known family Hx       Patient has UA positive and treated for sepsis from UTI. UCx positive for ESBL. Patient already received 4 days of Ertapenem and need to continue 3 more days in NH.   Constipation resolved with lactulose.   Sepsis resolved with 24 hours of Iv antibiotics and patient with good appetite and clinically significantly improved. m  Patient is medically stable to be discharged to NH as per attending.

## 2018-09-25 NOTE — DIETITIAN INITIAL EVALUATION ADULT. - PROBLEM SELECTOR PLAN 2
CT abd Fecal material is scattered throughout the colon. There is no evidence   for mechanical bowel obstruction. No colonic wall thickening  will give Lactulose and Milk of magnesia

## 2018-09-25 NOTE — PROGRESS NOTE ADULT - PROBLEM SELECTOR PLAN 1
On Ertapenem day 3 awaiting sensitivity and previous ESBL history this year  DC to [facility] with 3 more days of ertapenem as UCx ESBL

## 2018-09-25 NOTE — DISCHARGE NOTE ADULT - PATIENT PORTAL LINK FT
You can access the [facility] Patient Portal, offered by [facility], by registering with the following website: [email or web address]

## 2018-09-25 NOTE — DISCHARGE NOTE ADULT - CARE PLAN
Principal Discharge DX:	Urinary incontinence  Goal:	to treat the infection  Assessment and plan of treatment:	to continue ertapenem for 3 more days in NH to complete total 7 days as pt already finished 4 days inpatient.  Secondary Diagnosis:	Constipation  Goal:	resolved  Assessment and plan of treatment:	Continue with joelle and colace bowel regimen.

## 2018-09-25 NOTE — DISCHARGE NOTE ADULT - MEDICATION SUMMARY - MEDICATIONS TO TAKE
I will START or STAY ON the medications listed below when I get home from the hospital:    aspirin 81 mg oral tablet, chewable  -- 1 tab(s) by mouth once a day  -- Indication: For Cardioprotective    valproic acid 250 mg/5 mL oral syrup  -- 7 milliliter(s) by mouth 2 times a day  -- Indication: For Depression    escitalopram 10 mg oral tablet  -- 1 tab(s) by mouth once a day  -- Indication: For Depression    ertapenem 1 g injection  -- 1 gram(s) injectable once a day   -- Indication: For ESBL UTI     lactulose 10 g/15 mL oral syrup  -- 30 milliliter(s) by mouth once a day  -- Indication: For Constipation    senna oral tablet  -- 2 tab(s) by mouth once a day (at bedtime)  -- Indication: For Constipation    ocular lubricant ophthalmic solution  -- 1 drop(s) to each affected eye 4 times a day  -- Indication: For eye protection     nicotine 7 mg/24 hr transdermal film, extended release  --  by transdermal patch   -- Indication: For smoking cessation     oxybutynin 5 mg oral tablet  -- 1 tab(s) by mouth 3 times a day  -- Indication: For Urinary retention     Multiple Vitamins oral tablet  -- 1 tab(s) by mouth once a day  -- Indication: For supplement     Vitamin B-100 oral tablet  -- 1 tab(s) by mouth once a day  -- Indication: For supplement    Vitamin C 500 mg oral tablet  -- 1 tab(s) by mouth once a day  -- Indication: For supplement    ergocalciferol 50,000 intl units (1.25 mg) oral capsule  -- 1 cap(s) by mouth once a week  -- Indication: For vitamin D deficiency     cyanocobalamin 1000 mcg oral tablet  -- 1 tab(s) by mouth once a day  -- Indication: For supplement

## 2018-09-25 NOTE — PROVIDER CONTACT NOTE (CRITICAL VALUE NOTIFICATION) - ACTION/TREATMENT ORDERED:
Continue with antibiotic therapy. Lab value was from 9/22, pt not on contact. MD notified and made aware of antibiotic therapy. No further treatment ordered by Dr. Galicia. Continue with antibiotic therapy. Lab value was from 9/22. MD and Nurse manager notified. Manager requested contact isolation. Called admitting, no isolation or similar precaution bed available. Pt remains in room with contact isolation ordered and initiated

## 2018-09-25 NOTE — DISCHARGE NOTE ADULT - CARE PROVIDER_API CALL
Bret Carney (MD), Medicine  [address] [place] [address]  Phone: [phone]  Fax: [phone]

## 2018-09-25 NOTE — DISCHARGE NOTE ADULT - PLAN OF CARE
to treat the infection to continue ertapenem for 3 more days in NH to complete total 7 days as pt already finished 4 days inpatient. resolved Continue with joelle and colace bowel regimen.

## 2018-09-25 NOTE — DIETITIAN INITIAL EVALUATION ADULT. - FACTORS AFF FOOD INTAKE
other (specify)/change in mental status/difficulty chewing/difficulty feeding self/difficulty with food procurement/preparation/persistent constipation/persistent lack of appetite/weakness/fatigue, UTI, Parkinson disease, depression,

## 2018-09-25 NOTE — DIETITIAN INITIAL EVALUATION ADULT. - PERTINENT LABORATORY DATA
09-23 Na139 mmol/L Glu 93 mg/dL K+ 3.8 mmol/L Cr  0.66 mg/dL BUN 20 mg/dL<H> 09-23 Phos 3.2 mg/dL 09-22 Alb 3.3 g/dL<L> 09-23 [id number] 6.2 %<H> 09-23 Chol 127 mg/dL LDL 75 mg/dL HDL 32 mg/dL<L> Trig 101 mg/dL

## 2018-09-25 NOTE — PROGRESS NOTE ADULT - ASSESSMENT
72F from [facility], AAOx1, Bed bound, with PMH dementia, , depression, Parkinson's, HTN, CVA, L carotid stenosis, UTI with ESBL, gait instability, Urinary incontinence presenting with 1 day fever.

## 2019-05-13 NOTE — PATIENT PROFILE ADULT. - SOCIAL CONCERNS
None Burow's Advancement Flap Text: The defect edges were debeveled with a #15 scalpel blade.  Given the location of the defect and the proximity to free margins a Burow's advancement flap was deemed most appropriate.  Using a sterile surgical marker, the appropriate advancement flap was drawn incorporating the defect and placing the expected incisions within the relaxed skin tension lines where possible.    The area thus outlined was incised deep to adipose tissue with a #15 scalpel blade.  The skin margins were undermined to an appropriate distance in all directions utilizing iris scissors.

## 2021-02-13 NOTE — ED ADULT NURSE NOTE - GASTROINTESTINAL WDL
Immediate Postoperative / Post-Procedure Note    Preoperative Diagnosis: _     met adeno ca    Postoperative Diagnosis: _   gastric erythema, normal pancreas, small polyps in cecum, compressed lumen in sigmoid colon    Procedure(s): _   egd w/ bx, eus, colon w/ snare polypectomy and bx    Surgeon/Proceduralist: _ ellen    Assistants: thony leon    Anesthesia Type: _    mac    Estimated Blood Loss: _ none    Transfusion Summary: _  none    Specimen(s): _  yes    Grafts/Implants: _   n/a    Complications: _  none             Electronically Signed On 12.27.2019 18:46  ___________________________________________________   Rich Castro MD    
Abdomen soft, nontender, nondistended, bowel sounds present in all 4 quadrants.

## 2022-08-19 NOTE — ED PROVIDER NOTE - ENMT, MLM
Airway patent, Nasal mucosa clear. Mouth with normal mucosa. Throat has no vesicles, no oropharyngeal exudates and uvula is midline. show

## 2024-01-23 NOTE — DIETITIAN INITIAL EVALUATION ADULT. - PROBLEM SELECTOR PLAN 1
meets sepsis criteria on admission: leukocytosis 18, fever 102, source of infection  s/p 2L bolus  rocephin  f/u ucx and bcx  tylenol PRN fever  CXR neg  rvp neg  lactate normal  Dr. Gaytan- ID
Home

## 2024-01-26 NOTE — PATIENT PROFILE ADULT. - FALL HARM RISK
Called patient's daughter to update her Palliative care ,informed her [facility] doesn't take her insurance and the referral specialist is working with her insurance to find someone who is in her network.  
other/agitation/anxiety/weakness